# Patient Record
Sex: FEMALE | Race: ASIAN | NOT HISPANIC OR LATINO | Employment: UNEMPLOYED
[De-identification: names, ages, dates, MRNs, and addresses within clinical notes are randomized per-mention and may not be internally consistent; named-entity substitution may affect disease eponyms.]

---

## 2017-10-29 ENCOUNTER — HEALTH MAINTENANCE LETTER (OUTPATIENT)
Age: 57
End: 2017-10-29

## 2020-01-16 LAB
ALT SERPL-CCNC: 21 U/L (ref 6–29)
AST SERPL-CCNC: 20 U/L (ref 10–35)
CHOLESTEROL (EXTERNAL): 259 MG/DL
CREATININE (EXTERNAL): 0.52 MG/DL (ref 0.5–0.99)
GFR ESTIMATED (EXTERNAL): 103 ML/MIN/1.73M2
GFR ESTIMATED (IF AFRICAN AMERICAN) (EXTERNAL): 120 ML/MIN/1.73M2
GLUCOSE (EXTERNAL): 96 MG/DL (ref 65–99)
HDLC SERPL-MCNC: 41 MG/DL
LDL CHOLESTEROL (EXTERNAL): 163 MG/DL
NON HDL CHOLESTEROL (EXTERNAL): 218 MG/DL
POTASSIUM (EXTERNAL): 3.7 MMOL/L (ref 3.5–5.3)
TRIGLYCERIDES (EXTERNAL): 358 MG/DL

## 2021-01-28 LAB
ALT SERPL-CCNC: 24 U/L (ref 6–29)
AST SERPL-CCNC: 23 U/L (ref 10–35)
CHOLESTEROL (EXTERNAL): 282 MG/DL
CREATININE (EXTERNAL): 0.57 MG/DL (ref 0.5–0.99)
GFR ESTIMATED (EXTERNAL): 99 ML/MIN/1.73M2
GFR ESTIMATED (IF AFRICAN AMERICAN) (EXTERNAL): 115 ML/MIN/1.73M2
GLUCOSE (EXTERNAL): 107 MG/DL (ref 65–99)
HDLC SERPL-MCNC: 47 MG/DL
LDL CHOLESTEROL (EXTERNAL): 180 MG/DL
NON HDL CHOLESTEROL (EXTERNAL): 235 MG/DL
POTASSIUM (EXTERNAL): 4.2 MMOL/L (ref 3.5–5.3)
TRIGLYCERIDES (EXTERNAL): 325 MG/DL

## 2021-06-15 PROBLEM — E04.1 THYROID NODULE: Status: ACTIVE | Noted: 2017-01-05
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
## 2021-06-15 PROBLEM — S20.211A CHEST WALL HEMATOMA, RIGHT, INITIAL ENCOUNTER: Status: ACTIVE | Noted: 2017-01-04

## 2021-06-15 PROBLEM — S06.0X0A CONCUSSION WITHOUT LOSS OF CONSCIOUSNESS: Status: ACTIVE | Noted: 2017-01-06

## 2022-09-21 ENCOUNTER — TRANSFERRED RECORDS (OUTPATIENT)
Dept: HEALTH INFORMATION MANAGEMENT | Facility: CLINIC | Age: 62
End: 2022-09-21

## 2022-09-21 LAB
ALT SERPL-CCNC: 20 U/L (ref 6–29)
AST SERPL-CCNC: 19 U/L (ref 10–35)
CHOLESTEROL (EXTERNAL): 259 MG/DL
CREATININE (EXTERNAL): 0.56 MG/DL (ref 0.5–1.05)
GFR ESTIMATED (EXTERNAL): 102 ML/MIN/1.73M2
GLUCOSE (EXTERNAL): 109 MG/DL (ref 65–99)
HBA1C MFR BLD: 6.8 %
HDLC SERPL-MCNC: 49 MG/DL
LDL CHOLESTEROL (EXTERNAL): 175 MG/DL
NON HDL CHOLESTEROL (EXTERNAL): 210 MG/DL
POTASSIUM (EXTERNAL): 3.8 MMOL/L (ref 3.5–5.3)
TRIGLYCERIDES (EXTERNAL): 193 MG/DL
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
## 2023-01-31 ENCOUNTER — OFFICE VISIT (OUTPATIENT)
Dept: FAMILY MEDICINE | Facility: CLINIC | Age: 63
End: 2023-01-31
Payer: COMMERCIAL

## 2023-01-31 VITALS
SYSTOLIC BLOOD PRESSURE: 130 MMHG | HEIGHT: 58 IN | DIASTOLIC BLOOD PRESSURE: 75 MMHG | WEIGHT: 132 LBS | OXYGEN SATURATION: 99 % | RESPIRATION RATE: 20 BRPM | BODY MASS INDEX: 27.71 KG/M2 | TEMPERATURE: 98 F | HEART RATE: 75 BPM

## 2023-01-31 DIAGNOSIS — Z11.59 NEED FOR HEPATITIS C SCREENING TEST: ICD-10-CM

## 2023-01-31 DIAGNOSIS — Z13.220 SCREENING FOR HYPERLIPIDEMIA: ICD-10-CM

## 2023-01-31 DIAGNOSIS — I10 BENIGN ESSENTIAL HYPERTENSION: ICD-10-CM

## 2023-01-31 DIAGNOSIS — E78.00 PURE HYPERCHOLESTEROLEMIA: ICD-10-CM

## 2023-01-31 DIAGNOSIS — R20.0 NUMBNESS AND TINGLING IN LEFT HAND: ICD-10-CM

## 2023-01-31 DIAGNOSIS — G89.4 CHRONIC PAIN SYNDROME: ICD-10-CM

## 2023-01-31 DIAGNOSIS — E11.9 TYPE 2 DIABETES MELLITUS WITHOUT COMPLICATION, WITHOUT LONG-TERM CURRENT USE OF INSULIN (H): ICD-10-CM

## 2023-01-31 DIAGNOSIS — Z12.4 CERVICAL CANCER SCREENING: ICD-10-CM

## 2023-01-31 DIAGNOSIS — Z11.4 SCREENING FOR HIV (HUMAN IMMUNODEFICIENCY VIRUS): ICD-10-CM

## 2023-01-31 DIAGNOSIS — Z12.31 VISIT FOR SCREENING MAMMOGRAM: ICD-10-CM

## 2023-01-31 DIAGNOSIS — R20.2 NUMBNESS AND TINGLING IN LEFT HAND: ICD-10-CM

## 2023-01-31 DIAGNOSIS — M54.2 NECK PAIN: Primary | ICD-10-CM

## 2023-01-31 PROCEDURE — 99204 OFFICE O/P NEW MOD 45 MIN: CPT | Mod: GC

## 2023-01-31 RX ORDER — AMLODIPINE AND BENAZEPRIL HYDROCHLORIDE 5; 20 MG/1; MG/1
1 CAPSULE ORAL DAILY
Start: 2023-01-31 | End: 2023-04-03

## 2023-01-31 RX ORDER — GABAPENTIN 300 MG/1
300 CAPSULE ORAL AT BEDTIME
Qty: 90 CAPSULE | Refills: 1 | Status: SHIPPED | OUTPATIENT
Start: 2023-01-31 | End: 2024-01-17

## 2023-01-31 RX ORDER — ATORVASTATIN CALCIUM 40 MG/1
40 TABLET, FILM COATED ORAL DAILY
Start: 2023-01-31 | End: 2023-04-12

## 2023-01-31 RX ORDER — EZETIMIBE 10 MG/1
10 TABLET ORAL DAILY
Start: 2023-01-31 | End: 2023-04-03

## 2023-01-31 RX ORDER — FENOFIBRATE 145 MG/1
145 TABLET, COATED ORAL DAILY
Start: 2023-01-31 | End: 2023-04-03

## 2023-01-31 RX ORDER — METFORMIN HYDROCHLORIDE 750 MG/1
750 TABLET, EXTENDED RELEASE ORAL
Start: 2023-01-31 | End: 2023-04-03

## 2023-01-31 NOTE — PROGRESS NOTES
Preceptor Attestation:   Patient seen, evaluated and discussed with the resident. I have verified the content of the note, which accurately reflects my assessment of the patient and the plan of care.  Supervising Physician:Salome Leigh MD  [facility]

## 2023-02-01 ENCOUNTER — APPOINTMENT (OUTPATIENT)
Dept: INTERPRETER SERVICES | Facility: CLINIC | Age: 63
End: 2023-02-01
Payer: COMMERCIAL

## 2023-04-03 DIAGNOSIS — I10 BENIGN ESSENTIAL HYPERTENSION: ICD-10-CM

## 2023-04-03 DIAGNOSIS — E78.00 PURE HYPERCHOLESTEROLEMIA: ICD-10-CM

## 2023-04-03 DIAGNOSIS — E11.9 TYPE 2 DIABETES MELLITUS WITHOUT COMPLICATION, WITHOUT LONG-TERM CURRENT USE OF INSULIN (H): ICD-10-CM

## 2023-04-05 RX ORDER — ATORVASTATIN CALCIUM 20 MG/1
20 TABLET, FILM COATED ORAL AT BEDTIME
Qty: 90 TABLET | Refills: 0 | Status: SHIPPED | OUTPATIENT
Start: 2023-04-05 | End: 2023-04-12

## 2023-04-05 RX ORDER — METFORMIN HYDROCHLORIDE 750 MG/1
750 TABLET, EXTENDED RELEASE ORAL
Qty: 90 TABLET | Refills: 0 | Status: SHIPPED | OUTPATIENT
Start: 2023-04-05 | End: 2023-04-12

## 2023-04-05 RX ORDER — EZETIMIBE 10 MG/1
10 TABLET ORAL DAILY
Qty: 90 TABLET | Refills: 0 | Status: SHIPPED | OUTPATIENT
Start: 2023-04-05 | End: 2023-04-12

## 2023-04-05 RX ORDER — FENOFIBRATE 145 MG/1
145 TABLET, COATED ORAL DAILY
Qty: 90 TABLET | Refills: 0 | Status: SHIPPED | OUTPATIENT
Start: 2023-04-05 | End: 2023-04-12

## 2023-04-05 RX ORDER — AMLODIPINE AND BENAZEPRIL HYDROCHLORIDE 5; 20 MG/1; MG/1
1 CAPSULE ORAL DAILY
Qty: 90 CAPSULE | Refills: 0 | Status: SHIPPED | OUTPATIENT
Start: 2023-04-05 | End: 2023-04-12

## 2023-04-05 NOTE — TELEPHONE ENCOUNTER
Called pt son's because pt does not understand english. I called and let him know that he needed to make an appointment for Luze for medication refills and labs needed. He said he will let Chloe Silva (Mother) know and will give us a call back to schedule.     Thanks,   NAOMY, CMA

## 2023-04-05 NOTE — TELEPHONE ENCOUNTER
Please call the patient and ask her to come in for a med refill appointment in the next four weeks. She would need lab monitoring of her cholesterol and A1c.     Juanita Lopez MD

## 2023-04-12 ENCOUNTER — OFFICE VISIT (OUTPATIENT)
Dept: FAMILY MEDICINE | Facility: CLINIC | Age: 63
End: 2023-04-12
Payer: COMMERCIAL

## 2023-04-12 VITALS
BODY MASS INDEX: 27.92 KG/M2 | HEIGHT: 58 IN | DIASTOLIC BLOOD PRESSURE: 72 MMHG | OXYGEN SATURATION: 96 % | SYSTOLIC BLOOD PRESSURE: 112 MMHG | RESPIRATION RATE: 20 BRPM | TEMPERATURE: 98.2 F | HEART RATE: 72 BPM | WEIGHT: 133 LBS

## 2023-04-12 DIAGNOSIS — I10 BENIGN ESSENTIAL HYPERTENSION: ICD-10-CM

## 2023-04-12 DIAGNOSIS — E11.9 TYPE 2 DIABETES MELLITUS WITHOUT COMPLICATION, WITHOUT LONG-TERM CURRENT USE OF INSULIN (H): Primary | ICD-10-CM

## 2023-04-12 DIAGNOSIS — E78.00 PURE HYPERCHOLESTEROLEMIA: ICD-10-CM

## 2023-04-12 LAB
ANION GAP SERPL CALCULATED.3IONS-SCNC: 14 MMOL/L (ref 7–15)
BUN SERPL-MCNC: 11.2 MG/DL (ref 8–23)
CALCIUM SERPL-MCNC: 9.4 MG/DL (ref 8.8–10.2)
CHLORIDE SERPL-SCNC: 103 MMOL/L (ref 98–107)
CREAT SERPL-MCNC: 0.57 MG/DL (ref 0.51–0.95)
CREAT UR-MCNC: 39.9 MG/DL
DEPRECATED HCO3 PLAS-SCNC: 21 MMOL/L (ref 22–29)
GFR SERPL CREATININE-BSD FRML MDRD: >90 ML/MIN/1.73M2
GLUCOSE SERPL-MCNC: 250 MG/DL (ref 70–99)
HBA1C MFR BLD: 7.2 % (ref 0–5.6)
MICROALBUMIN UR-MCNC: <12 MG/L
MICROALBUMIN/CREAT UR: NORMAL MG/G{CREAT}
POTASSIUM SERPL-SCNC: 3.7 MMOL/L (ref 3.4–5.3)
SODIUM SERPL-SCNC: 138 MMOL/L (ref 136–145)

## 2023-04-12 PROCEDURE — 83036 HEMOGLOBIN GLYCOSYLATED A1C: CPT

## 2023-04-12 PROCEDURE — 82570 ASSAY OF URINE CREATININE: CPT

## 2023-04-12 PROCEDURE — 99214 OFFICE O/P EST MOD 30 MIN: CPT | Mod: GC

## 2023-04-12 PROCEDURE — 36415 COLL VENOUS BLD VENIPUNCTURE: CPT

## 2023-04-12 PROCEDURE — 80048 BASIC METABOLIC PNL TOTAL CA: CPT

## 2023-04-12 PROCEDURE — 82043 UR ALBUMIN QUANTITATIVE: CPT

## 2023-04-12 RX ORDER — ATORVASTATIN CALCIUM 40 MG/1
40 TABLET, FILM COATED ORAL DAILY
Qty: 90 TABLET | Refills: 3 | Status: SHIPPED | OUTPATIENT
Start: 2023-04-12 | End: 2024-04-05

## 2023-04-12 RX ORDER — FENOFIBRATE 145 MG/1
145 TABLET, COATED ORAL DAILY
Qty: 90 TABLET | Refills: 3 | Status: SHIPPED | OUTPATIENT
Start: 2023-04-12 | End: 2024-07-01

## 2023-04-12 RX ORDER — METFORMIN HYDROCHLORIDE 750 MG/1
750 TABLET, EXTENDED RELEASE ORAL
Qty: 90 TABLET | Refills: 3 | Status: SHIPPED | OUTPATIENT
Start: 2023-04-12 | End: 2023-11-13

## 2023-04-12 RX ORDER — EZETIMIBE 10 MG/1
10 TABLET ORAL DAILY
Qty: 90 TABLET | Refills: 3 | Status: SHIPPED | OUTPATIENT
Start: 2023-04-12 | End: 2024-05-02

## 2023-04-12 RX ORDER — AMLODIPINE AND BENAZEPRIL HYDROCHLORIDE 5; 20 MG/1; MG/1
1 CAPSULE ORAL DAILY
Qty: 90 CAPSULE | Refills: 3 | Status: SHIPPED | OUTPATIENT
Start: 2023-04-12 | End: 2023-11-13

## 2023-04-12 NOTE — LETTER
April 17, 2023      Chloe Silva  [address]  [place] [address]        Dear ,    We are writing to inform you of your test results.    At your appointment earlier this week, we checked a couple of labs and the results are back. We checked your electrolytes and kidney function, and which was normal. This test showed high blood sugar levels, but this is expected with your diabetes. We also did a test of your urine to make sure it didn't have extra protein in it; this test came back normal.        Resulted Orders   Hemoglobin A1c   Result Value Ref Range    Hemoglobin A1C 7.2 (H) 0.0 - 5.6 %      Comment:      Normal <5.7%   Prediabetes 5.7-6.4%    Diabetes 6.5% or higher     Note: Adopted from ADA consensus guidelines.   Albumin Random Urine Quantitative with Creat Ratio   Result Value Ref Range    Creatinine Urine mg/dL 39.9 mg/dL      Comment:      The reference ranges have not been established in urine creatinine. The results should be integrated into the clinical context for interpretation.    Albumin Urine mg/L <12.0 mg/L      Comment:      The reference ranges have not been established in urine albumin. The results should be integrated into the clinical context for interpretation.    Albumin Urine mg/g Cr        Comment:      Unable to calculate, urine albumin and/or urine creatinine is outside detectable limits.  Microalbuminuria is defined as an albumin:creatinine ratio of 17 to 299 for males and 25 to 299 for females. A ratio of albumin:creatinine of 300 or higher is indicative of overt proteinuria.  Due to biologic variability, positive results should be confirmed by a second, first-morning random or 24-hour timed urine specimen. If there is discrepancy, a third specimen is recommended. When 2 out of 3 results are in the microalbuminuria range, this is evidence for incipient nephropathy and warrants increased efforts at glucose control, blood pressure control, and institution of therapy  with an angiotensin-converting-enzyme (ACE) inhibitor (if the patient can tolerate it).     Basic metabolic panel   Result Value Ref Range    Sodium 138 136 - 145 mmol/L    Potassium 3.7 3.4 - 5.3 mmol/L    Chloride 103 98 - 107 mmol/L    Carbon Dioxide (CO2) 21 (L) 22 - 29 mmol/L    Anion Gap 14 7 - 15 mmol/L    Urea Nitrogen 11.2 8.0 - 23.0 mg/dL    Creatinine 0.57 0.51 - 0.95 mg/dL    Calcium 9.4 8.8 - 10.2 mg/dL    Glucose 250 (H) 70 - 99 mg/dL    GFR Estimate >90 >60 mL/min/1.73m2      Comment:      eGFR calculated using 2021 CKD-EPI equation.       If you have any questions or concerns, please call the clinic at the number listed above.       Sincerely,      Fanny Humphrey MD

## 2023-04-12 NOTE — PROGRESS NOTES
Assessment & Plan     Type 2 diabetes mellitus without complication, without long-term current use of insulin (H)  Patient presents for diabetes follow up. Discussed her healthy diet. Currently managed on metformin 750 XR daily. Last A1c six months ago was 6.8, today is 7.2. Patient does not take her medications consistently, discussed the importance of taking her metformin daily. Patient has not had a diabetic eye exam; discussed that she would be willing to go if she were able to get a medical ride. Will discuss with clinic  to see if she qualifies, and will place referral today.   - Hemoglobin A1c  - Albumin Random Urine Quantitative with Creat Ratio  - Basic metabolic panel  - Adult Eye  Referral; Future  - metFORMIN (GLUCOPHAGE-XR) 750 MG 24 hr tablet; Take 1 tablet (750 mg) by mouth daily (with dinner)  - [facility] FOOT EXAM  NO CHARGE    Benign essential hypertension  Patient reports that she takes this medication daily. Blood pressure is good today.   - amLODIPine-benazepril (LOTREL) 5-20 MG capsule; Take 1 capsule by mouth daily    Pure hypercholesterolemia  Refilled previously prescribed medications. Lipid panel was last checked six months ago, will repeat at next visit.   - fenofibrate (TRICOR) 145 MG tablet; Take 1 tablet (145 mg) by mouth daily  - ezetimibe (ZETIA) 10 MG tablet; Take 1 tablet (10 mg) by mouth daily  - atorvastatin (LIPITOR) 40 MG tablet; Take 1 tablet (40 mg) by mouth daily    Return in about 6 months (around 10/12/2023).    Juanita Lopez MD  [facility]    Gregor Corona is a 62 year old, presenting for the following health issues:  RECHECK (DM)    HPI     DM follow up  - Current medications: metformin 750 XR daily    Any recent medication changes: No recent changes    Any problems/side effects after medication change: Takes every so often, does not take daily.   - Diet: bland diet, eats a lot of vegetables. Doesn't eat noodles, eats  "rice with most meals, tries to eat balanced diet.   - Neuropathy in feet: No   - Last Ophthalmology visit: none, will place referral today.   - Last A1C: 6.8 in September 2022  - Hypoglycemia sx: Denies headaches, lightheadedness, confusion, diaphoresis, tremor, palpitations    HTN follow up  - Medications: amlodipine-benzapril 5-20 mg    - Any recent changes: None   - Any side effects/intolerance: None   - Home BP values: None   - Low BP sx: Denies lightheadedness, orthostatic hypotension  - High BP sx: Denies headache, visual blurring, chest pain, SOB, leg swelling    Review of Systems   Constitutional, HEENT, cardiovascular, pulmonary, gi and gu systems are negative, except as otherwise noted.      Objective    /72   Pulse 72   Temp 98.2  F (36.8  C)   Resp 20   Ht 1.473 m (4' 10\")   Wt 60.3 kg (133 lb)   SpO2 96%   BMI 27.80 kg/m    Body mass index is 27.8 kg/m .  Physical Exam   GENERAL: healthy, alert and no distress  EYES: Eyes grossly normal to inspection, PERRL and conjunctivae and sclerae normal  RESP: lungs clear to auscultation - no rales, rhonchi or wheezes  CV: regular rate and rhythm, normal S1 S2, no S3 or S4, no murmur, click or rub, no peripheral edema and peripheral pulses strong  MS: no gross musculoskeletal defects noted, no edema  SKIN: no suspicious lesions or rashes  PSYCH: mentation appears normal, affect normal/bright  Diabetic foot exam: normal DP and PT pulses, no trophic changes or ulcerative lesions, normal sensory exam and normal monofilament exam            "

## 2023-04-19 NOTE — PROGRESS NOTES
Preceptor Attestation:   Patient seen, evaluated and discussed with the resident. I have verified the content of the note, which accurately reflects my assessment of the patient and the plan of care.    Supervising Physician:Fanny Humphrey MD    [facility]

## 2023-04-20 ENCOUNTER — PATIENT OUTREACH (OUTPATIENT)
Dept: CARE COORDINATION | Facility: CLINIC | Age: 63
End: 2023-04-20
Payer: COMMERCIAL

## 2023-04-20 NOTE — PROGRESS NOTES
Clinic Care Coordination Contact    Follow Up Progress Note      Assessment: I got a message from  about the pt. It seems like the pt is having issue with transportation. I called [facility] to see if the pt has transportation benefits. They stated that the pt has transportation benefits.     I called the pt, her phone rings, then goes busy.     Care Gaps:    Health Maintenance Due   Topic Date Due     YEARLY PREVENTIVE VISIT  Never done     ADVANCE CARE PLANNING  Never done     EYE EXAM  Never done     MAMMO SCREENING  Never done     COVID-19 Vaccine (1) Never done     Pneumococcal Vaccine: Pediatrics (0 to 5 Years) and At-Risk Patients (6 to 64 Years) (1 - PCV) Never done     COLORECTAL CANCER SCREENING  Never done     HIV SCREENING  Never done     HEPATITIS C SCREENING  Never done     PAP  Never done     DTAP/TDAP/TD IMMUNIZATION (1 - Tdap) Never done     ZOSTER IMMUNIZATION (1 of 2) Never done     INFLUENZA VACCINE (1) 09/01/2022           Care Plans      Intervention/Education provided during outreach:               Plan:     Care Coordinator will follow up in

## 2023-07-27 ENCOUNTER — TRANSFERRED RECORDS (OUTPATIENT)
Dept: HEALTH INFORMATION MANAGEMENT | Facility: CLINIC | Age: 63
End: 2023-07-27
Payer: COMMERCIAL

## 2023-10-30 ENCOUNTER — OFFICE VISIT (OUTPATIENT)
Dept: FAMILY MEDICINE | Facility: CLINIC | Age: 63
End: 2023-10-30
Payer: COMMERCIAL

## 2023-10-30 VITALS
TEMPERATURE: 97.7 F | WEIGHT: 136 LBS | DIASTOLIC BLOOD PRESSURE: 84 MMHG | BODY MASS INDEX: 28.55 KG/M2 | OXYGEN SATURATION: 99 % | RESPIRATION RATE: 20 BRPM | SYSTOLIC BLOOD PRESSURE: 152 MMHG | HEIGHT: 58 IN | HEART RATE: 74 BPM

## 2023-10-30 DIAGNOSIS — K21.9 GASTROESOPHAGEAL REFLUX DISEASE WITHOUT ESOPHAGITIS: Primary | ICD-10-CM

## 2023-10-30 DIAGNOSIS — Z12.11 SCREEN FOR COLON CANCER: ICD-10-CM

## 2023-10-30 DIAGNOSIS — K59.09 OTHER CONSTIPATION: ICD-10-CM

## 2023-10-30 DIAGNOSIS — E11.9 TYPE 2 DIABETES MELLITUS WITHOUT COMPLICATION, WITHOUT LONG-TERM CURRENT USE OF INSULIN (H): ICD-10-CM

## 2023-10-30 LAB — HBA1C MFR BLD: 7.9 % (ref 0–5.6)

## 2023-10-30 PROCEDURE — 36415 COLL VENOUS BLD VENIPUNCTURE: CPT

## 2023-10-30 PROCEDURE — 83721 ASSAY OF BLOOD LIPOPROTEIN: CPT | Mod: XU

## 2023-10-30 PROCEDURE — 91320 SARSCV2 VAC 30MCG TRS-SUC IM: CPT

## 2023-10-30 PROCEDURE — 99214 OFFICE O/P EST MOD 30 MIN: CPT | Mod: 25

## 2023-10-30 PROCEDURE — 80061 LIPID PANEL: CPT

## 2023-10-30 PROCEDURE — 90471 IMMUNIZATION ADMIN: CPT

## 2023-10-30 PROCEDURE — 83036 HEMOGLOBIN GLYCOSYLATED A1C: CPT

## 2023-10-30 PROCEDURE — 90662 IIV NO PRSV INCREASED AG IM: CPT

## 2023-10-30 PROCEDURE — 90480 ADMN SARSCOV2 VAC 1/ONLY CMP: CPT

## 2023-10-30 RX ORDER — FAMOTIDINE 10 MG
10 TABLET ORAL 2 TIMES DAILY
Qty: 60 TABLET | Refills: 0 | Status: SHIPPED | OUTPATIENT
Start: 2023-10-30 | End: 2023-11-13

## 2023-10-30 RX ORDER — POLYETHYLENE GLYCOL 3350 17 G/17G
1 POWDER, FOR SOLUTION ORAL DAILY
Qty: 578 G | Refills: 0 | Status: SHIPPED | OUTPATIENT
Start: 2023-10-30 | End: 2024-06-03

## 2023-10-30 NOTE — PROGRESS NOTES
Assessment & Plan     Gastroesophageal reflux disease without esophagitis  Patient describes symptoms consistent with GERD for the past month. No epigastric tenderness on exam, denies black or bloody stools. Doesn't eat meals late, no ibuprofen use, minimal caffeine. Will start treatment with BID Pepcid and follow-up in two weeks.   - famotidine (PEPCID) 10 MG tablet  Dispense: 60 tablet; Refill: 0    Other constipation  Patient describes decreased frequency of bowel movements and increasing straining to have a BM over the past month. Denies melena or hematochezia. Discussed dietary recommendations. Will start Miralax today.   - polyethylene glycol (MIRALAX) 17 GM/Dose powder  Dispense: 578 g; Refill: 0    Type 2 diabetes mellitus without complication, without long-term current use of insulin (H)  Patient is currently on metformin, last A1c was 7.2 six months ago. Will recheck A1c today.   - HEMOGLOBIN A1C  - Lipid panel reflex to direct LDL Non-fasting    Screen for colon cancer  No history of colon cancer screening on file. Discussed recommendation for everyone over 50, but especially in the context of recent change in bowel movements. She declines today, but says she will consider if her bowel movements are not improved by next appointment.     Return in about 2 weeks (around 11/13/2023).    Juanita Lopez MD  [facility] Corona is a 63 year old, presenting for the following health issues:  Gastrophageal Reflux (Feels bloating too ) and Constipation    HPI     Reflux   - Burning feeling in the center of her chest  - She notices it the most in the morning  - Has been struggling with this for a month   - Hasn't had this issue before   - Denies bloody or black stools   - No ibuprofen use   - Eats dinner at about 6:00-7:00 PM     Constipation  - Used to have one to two bowel movements in a day  - In the past month, changed to every 3 days   - Straining to have a bowel  "movement   - The bowel movements are not pebble-like when they occur, just less frequent   - Notes some bloating during this period of time, denies abdominal pain  - Denies nausea or vomiting   - She has never had a colonoscopy   - Would consider FIT test at next appointment     Review of Systems   Constitutional, HEENT, cardiovascular, pulmonary, gi and gu systems are negative, except as otherwise noted.      Objective    BP (!) 152/84   Pulse 74   Temp 97.7  F (36.5  C)   Resp 20   Ht 1.475 m (4' 10.07\")   Wt 61.7 kg (136 lb)   SpO2 99%   BMI 28.35 kg/m    Body mass index is 28.35 kg/m .  Physical Exam     GENERAL: Healthy, alert and no distress  EYES: Eyes grossly normal to inspection.  No discharge or erythema, or obvious scleral/conjunctival abnormalities.  RESP: No audible wheeze, cough, or visible cyanosis.  No visible retractions or increased work of breathing.   AB: Soft, non-tender, non-distended.    SKIN: Visible skin clear. No significant rash, abnormal pigmentation or lesions.  NEURO: Cranial nerves grossly intact.  Mentation and speech appropriate for age.  PSYCH: Mentation appears normal, affect normal/bright, judgement and insight intact, normal speech and appearance well-groomed.                  "

## 2023-10-30 NOTE — COMMUNITY RESOURCES LIST (ENGLISH)
10/30/2023   [facility]  [facility]A  For questions about this resource list or additional care needs, please contact your primary care clinic or care manager.  Phone: [phone]   Email: N/A   Address: [address] [place] [address]   Hours: N/A        Hotlines and Helplines       Hotline - Housing crisis  1  [facility] Distance: 11.22 miles      Phone/Virtual   [address] [place] [address]  Language: English  Hours: Mon - Sun Open 24 Hours   Phone: (634) 931-8893 Email: communications@[facility].org Website: https://[facility]-[place].org/oursaviourshousing/     2  [facility] Distance: 12.83 miles      Phone/Virtual   [address] [place] [address]  Language: English  Hours: Mon - Sun Open 24 Hours   Phone: (110) 506-9391 Email: info@[facility].org Website: http://www.[facility].org          Housing       Coordinated Entry access point  3  [facility] Distance: 3.85 miles      In-Person, Phone/Virtual   [address] [place] [address]  Language: English, Nepali  Hours: Mon - Fri 8:30 AM - 4:30 PM  Fees: Free   Phone: (261) 949-4662 Email: info@[facility].org Website: https://www.[facility].org/locations/[facility]-clinic/     4  [facility] - Coordinated Access to Housing and Shelter ([facility]) - Coordinated Access Distance: 6.03 miles      In-Person, Phone/Virtual   [address] [place] [address]  Language: English  Hours: Mon - Fri 8:00 AM - 4:30 PM  Fees: Free   Phone: (904) 993-2128 Website: https://www.[facility]./residents/assistance-support/assistance/housing-services-support     Drop-in center or day shelter  5  [facility] Distance: 2.3 miles      In-Person   [address] [place] [address]  Language: English  Hours: Mon - Fri 9:00 AM - 4:00 PM  Fees: Free   Phone: (505) 903-7933 Email: frontelisek@[employer].org Website:  http://[facility].org     6  [facility] Distance: 11.23 miles      In-Person   [address] [place] [address]  Language: English, Kuwaiti, Romanian  Hours: Mon - Sat 7:00 AM - 3:00 PM  Fees: Free, Self Pay   Phone: (905) 357-1352 Email: info@[employer] Website: https://www.[employer]/locations/opportunity-center/     Housing search assistance  7  [facility] - Housing Search Assistance Distance: 3.73 miles      Phone/Virtual   [address] [place] [address]  Language: Chinese, English, Hmong, Fanny, Kuwaiti, Romanian  Hours: Mon - Fri Appt. Only  Fees: Free   Phone: (707) 555-2180 Website: https://[employer]/     8  [facility] - Online Housing Search Assistance Distance: 7.02 miles      Phone/Virtual   [address] [place] [address]  Language: English  Hours: Mon - Sun Open 24 Hours  Fees: Free   Phone: (425) 252-9067 Email: findjose@[employer] Website: https://[employer]/     Shelter for families  9   [facility] Distance: 12.75 miles      In-Person   [address] [place] [address]  Language: English  Hours: Mon - Fri 3:00 PM - 9:00 AM , Sat - Sun Open 24 Hours  Fees: Free   Phone: (999) 805-2790 Ext.1 Website: https://www.[facility].org/2020/07/03/emergency-family-shelter/     Shelter for individuals  10  [facility] [facility] [place] [facility] [place] [facility] Distance: 3.9 miles      In-Person   [address] [place] [address]  Language: English  Hours: Mon - Sun 5:00 PM - 10:00 AM  Fees: Free, Self Pay   Phone: (961) 319-5145 Email: info@[employer] Website: https://www.[employer]/locations/[facility]/     11  [facility] Distance: 11.22 miles      In-Person   [address] [place] [address]   Language: English  Hours: Mon - Sun Open 24 Hours  Fees: Free   Phone: (215) 904-8421 Email: communications@[facility].org Website: https://[facility].org/oursaviourshousing/          Transportation       Free or low-cost transportation  12  [employer] [facility] - Free or low-cost transportation Distance: 5.37 miles      In-Person   [address] [place] [address]  Language: English  Hours: Mon - Fri 9:00 AM - 5:00 PM  Fees: Free   Phone: (355) 750-3412 Email: info@[employer] Website: https://www.[employer]/     13  [facility] [facility] [place] [facility] Distance: 5.85 miles      In-Person   [address] [place] [address]  Language: English  Hours: Tue 9:00 AM - 2:00 PM  Fees: Self Pay   Phone: (263) 854-4721 Email: info@[employer] Website: http://www.[employer].org/     Transportation to medical appointments  14  [facility] - Non-Emergency Medical Transportation Distance: 4.31 miles      In-Person   [address] [place] [address]  Language: English  Hours: Mon - Fri 8:00 AM - 4:00 PM Appt. Only  Fees: Self Pay   Phone: (857) 260-1105 Website: http://www.[employer].comhealth.org/Medical-Services/Emergency-medical-services/Non-emergency-transportation/     15  [employer] [facility] Distance: 4.35 miles      In-Person   [address] [address]  Language: English  Hours: Mon - Thu 6:00 AM - 6:00 PM , Fri 6:00 AM - 5:00 PM  Fees: Insurance, Self Pay   Phone: (789) 919-3349 Email: office@[employer] Website: https://www.[employer]/          Important Numbers & Websites       Emergency Services   911  City Services   311  Poison Control   (156) 580-1180  Suicide Prevention Lifeline   (147) 455-2732 (TALK)  Child Abuse Hotline   (507) 323-4343 (4-A-Child)  Sexual Assault Hotline   (476) 838-3879 (HOPE)  National Runaway Safeline   (517) 196-2572 (RUNAWAY)  All-Options Talkline   (458) 649-8634  Substance Abuse Referral   (413) 846-7960 (HELP)

## 2023-10-30 NOTE — COMMUNITY RESOURCES LIST (ENGLISH)
10/30/2023   [facility] - Outpatient Clinics  N/A  For additional resource needs, please contact your health insurance member services or your primary care team.  Phone: [phone]   Email: N/A   Address: [facility] [place] [address]   Hours: N/A        Hotlines and Helplines       Hotline - Housing crisis  1  [facility] Distance: 11.22 miles      Phone/Virtual   [address] [place] [address]  Language: English  Hours: Mon - Sun Open 24 Hours   Phone: (794) 529-5517 Email: communications@[facility].[facility] Website: https://[facility].org/oursaviourshousing/     2  [facility] Distance: 12.83 miles      Phone/Virtual   [address] [place] [address]  Language: English  Hours: Mon - Sun Open 24 Hours   Phone: (807) 890-5030 Email: info@[facility].[employer] Website: http://www.[facility].org          Housing       Coordinated Entry access point  3  [facility] [place] [facility] Distance: 3.85 miles      In-Person, Phone/Virtual   [address] [place] [address]  Language: English, Irish  Hours: Mon - Fri 8:30 AM - 4:30 PM  Fees: Free   Phone: (870) 769-2527 Email: info@[facility].org Website: https://www.[facility].org/locations/[facility]/     4  [facility] - Coordinated Access to Housing and Shelter ([facility]) - Coordinated Access Distance: 6.03 miles      In-Person, Phone/Virtual   [address] [place] [address]  Language: English  Hours: Mon - Fri 8:00 AM - 4:30 PM  Fees: Free   Phone: (308) 623-7160 Website: https://www.[facility]./residents/assistance-support/assistance/housing-services-support     Drop-in center or day shelter  5  [facility] Distance: 2.3 miles      In-Person   [address] [place] [address]  Language: English  Hours: Mon - Fri 9:00 AM - 4:00 PM  Fees: Free   Phone: (857) 860-5463 Email: cheryl@[facility].org Website:  http://listeninghouse.org     6  [facility] Distance: 11.23 miles      In-Person   [address] [place] [address]  Language: English, Samoan, Ukrainian  Hours: Mon - Sat 7:00 AM - 3:00 PM  Fees: Free, Self Pay   Phone: (392) 658-4637 Email: info@[employer] Website: https://www.[employer]/locations/opportunity-center/     Housing search assistance  7  [employer] - https://[employer]/ Distance: 11.32 miles      Phone/Virtual   [address] [place] [address]  Language: English  Hours: Mon - Sun Open 24 Hours   Email: info@[employer] Website: https://[employer]     8  [facility] - Online housing search assistance Distance: 12.47 miles      Phone/Virtual   [address] [address]  Language: English, Hmong, Samoan, Ukrainian  Hours: Mon - Sun Open 24 Hours   Phone: (187) 447-7064 Email: info@[employer].org Website: http://www.[facility].org/     Shelter for families  9  [facility] Distance: 12.75 miles      In-Person   [address] [place] [address]  Language: English  Hours: Mon - Fri 3:00 PM - 9:00 AM , Sat - Sun Open 24 Hours  Fees: Free   Phone: (184) 428-8235 Ext.1 Website: https://www.[facility].org/2020/07/03/emergency-family-shelter/     Shelter for individuals  10  [facility] [place] [facility] [place] [facility] Distance: 3.9 miles      In-Person   [address] [place] [address]  Language: English  Hours: Mon - Sun 5:00 PM - 10:00 AM  Fees: Free, Self Pay   Phone: (689) 696-2058 Email: info@[employer] Website: https://www.[employer]/locations/[facility]/     11  [facility] Distance: 11.22 miles      In-Person   [address] [place] [place] [address]  Language: English  Hours: Mon - Sun Open 24 Hours  Fees:  Free   Phone: (854) 882-9349 Email: communications@[facility].org Website: https://[facility].org/oursaviourshousing/          Transportation       Free or low-cost transportation  12  [employer] [facility] - Free or low-cost transportation Distance: 5.37 miles      In-Person   [address] [place] [address]  Language: English  Hours: Mon - Fri 9:00 AM - 5:00 PM  Fees: Free   Phone: (297) 578-8031 Email: info@[employer] Website: https://www.[employer]/     13  [facility] [facility] [employer] [facility] Distance: 5.85 miles      In-Person   [address] [place] [address]  Language: English  Hours: Tue 9:00 AM - 2:00 PM  Fees: Self Pay   Phone: (463) 834-2866 Email: info@[employer] Website: http://www.[employer].org/     Transportation to medical appointments  14  [employer] Medical Transportation - Non-Emergency Medical Transportation Distance: 4.31 miles      In-Person   [address] [place] [address]  Language: English  Hours: Mon - Fri 8:00 AM - 4:00 PM Appt. Only  Fees: Self Pay   Phone: (908) 393-5240 Website: http://www.[employer].org/Medical-Services/Emergency-medical-services/Non-emergency-transportation/     15  [employer] [facility] Distance: 4.35 miles      In-Person   [address] [address]  Language: English  Hours: Mon - Thu 6:00 AM - 6:00 PM , Fri 6:00 AM - 5:00 PM  Fees: Insurance, Self Pay   Phone: (457) 265-7328 Email: office@[employer] Website: https://www.[employer]/          Important Numbers & Websites       [address].org  Poison Control   (103) 349-1154 [place]  Suicide and Crisis Lifeline   988 [facility]88[facility].org  Childhelp National Child Abuse Hotline   938.178.5014 Childhelphotline.org  National Sexual Assault Hotline   (942) 940-2240 (HOPE) Rainn.org  National Runaway Safeline   (721) 374-2255 (RUNAWAY) 1800runaway.org  Pregnancy & Postpartum Support [place]   Call/text 946-665-2501  Ppsupportmn.org  Substance Abuse National Helpline ([facility]   229-451-HELP (8394) Findtreatment.gov  Emergency Services   913

## 2023-10-30 NOTE — LETTER
November 2, 2023      Chloe Silva  [address]  [place] [address]        Dear ,    We are writing to inform you of your test results.    At your last visit, we checked your cholesterol levels and A1c.     1. Your A1c is slightly higher than last check. It was 7.2 six months ago, and is now up to 7.9. At your next visit on 11/13, we can talk about possibly increasing your metformin.     2. Your cholesterol continues to be very elevated, and I know you are on multiple medicines for this. We can talk about possible changes at the next appointment.     See you on November 13th!         Resulted Orders   HEMOGLOBIN A1C   Result Value Ref Range    Hemoglobin A1C 7.9 (H) 0.0 - 5.6 %      Comment:      Normal <5.7%   Prediabetes 5.7-6.4%    Diabetes 6.5% or higher     Note: Adopted from ADA consensus guidelines.   Lipid panel reflex to direct LDL Non-fasting   Result Value Ref Range    Cholesterol 286 (H) <200 mg/dL    Triglycerides 876 (H) <150 mg/dL    Direct Measure HDL 42 (L) >=50 mg/dL    LDL Cholesterol Calculated        Comment:      Cannot estimate LDL when triglyceride exceeds 400 mg/dL    Non HDL Cholesterol 244 (H) <130 mg/dL    Narrative    Cholesterol  Desirable:  <200 mg/dL    Triglycerides  Normal:  Less than 150 mg/dL  Borderline High:  150-199 mg/dL  High:  200-499 mg/dL  Very High:  Greater than or equal to 500 mg/dL    Direct Measure HDL  Female:  Greater than or equal to 50 mg/dL   Male:  Greater than or equal to 40 mg/dL    LDL Cholesterol  Desirable:  <100mg/dL  Above Desirable:  100-129 mg/dL   Borderline High:  130-159 mg/dL   High:  160-189 mg/dL   Very High:  >= 190 mg/dL    Non HDL Cholesterol  Desirable:  130 mg/dL  Above Desirable:  130-159 mg/dL  Borderline High:  160-189 mg/dL  High:  190-219 mg/dL  Very High:  Greater than or equal to 220 mg/dL   LDL cholesterol direct   Result Value Ref Range    LDL Cholesterol Direct 123 (H) <100 mg/dL      Comment:      Age 2-19  years:  Desirable: 0-110 mg/dL   Borderline high: 110-129 mg/dL   High: >= 130 mg/dL    Age 20 years and older:  Desirable: <100mg/dL  Above desirable: 100-129 mg/dL   Borderline high: 130-159 mg/dL   High: 160-189 mg/dL   Very high: >= 190 mg/dL       If you have any questions or concerns, please call the clinic at the number listed above.       Sincerely,      Ilda Funez MD

## 2023-10-30 NOTE — PROGRESS NOTES
Faculty Supervision of Residents   I have examined this patient and the medical care has been evaluated and discussed with the resident. See resident note outlining our discussion.      Ilda Funez MD

## 2023-10-31 LAB
CHOLEST SERPL-MCNC: 286 MG/DL
HDLC SERPL-MCNC: 42 MG/DL
LDLC SERPL CALC-MCNC: ABNORMAL MG/DL
LDLC SERPL DIRECT ASSAY-MCNC: 123 MG/DL
NONHDLC SERPL-MCNC: 244 MG/DL
TRIGL SERPL-MCNC: 876 MG/DL

## 2023-11-13 ENCOUNTER — OFFICE VISIT (OUTPATIENT)
Dept: FAMILY MEDICINE | Facility: CLINIC | Age: 63
End: 2023-11-13
Payer: COMMERCIAL

## 2023-11-13 VITALS
HEART RATE: 75 BPM | RESPIRATION RATE: 22 BRPM | WEIGHT: 133 LBS | BODY MASS INDEX: 27.92 KG/M2 | OXYGEN SATURATION: 98 % | TEMPERATURE: 97 F | DIASTOLIC BLOOD PRESSURE: 81 MMHG | HEIGHT: 58 IN | SYSTOLIC BLOOD PRESSURE: 143 MMHG

## 2023-11-13 DIAGNOSIS — K21.9 GASTROESOPHAGEAL REFLUX DISEASE WITHOUT ESOPHAGITIS: Primary | ICD-10-CM

## 2023-11-13 DIAGNOSIS — E78.1 HYPERTRIGLYCERIDEMIA: ICD-10-CM

## 2023-11-13 DIAGNOSIS — K59.09 OTHER CONSTIPATION: ICD-10-CM

## 2023-11-13 DIAGNOSIS — I10 BENIGN ESSENTIAL HYPERTENSION: ICD-10-CM

## 2023-11-13 DIAGNOSIS — E11.9 TYPE 2 DIABETES MELLITUS WITHOUT COMPLICATION, WITHOUT LONG-TERM CURRENT USE OF INSULIN (H): ICD-10-CM

## 2023-11-13 DIAGNOSIS — R11.2 NAUSEA AND VOMITING, UNSPECIFIED VOMITING TYPE: ICD-10-CM

## 2023-11-13 DIAGNOSIS — E78.2 MIXED HYPERLIPIDEMIA: ICD-10-CM

## 2023-11-13 DIAGNOSIS — Z12.11 SCREEN FOR COLON CANCER: ICD-10-CM

## 2023-11-13 PROCEDURE — 99214 OFFICE O/P EST MOD 30 MIN: CPT | Mod: GC

## 2023-11-13 RX ORDER — FAMOTIDINE 10 MG
10 TABLET ORAL 2 TIMES DAILY
Qty: 180 TABLET | Refills: 3 | Status: SHIPPED | OUTPATIENT
Start: 2023-11-13 | End: 2024-06-03

## 2023-11-13 RX ORDER — METFORMIN HYDROCHLORIDE 750 MG/1
750 TABLET, EXTENDED RELEASE ORAL
Qty: 90 TABLET | Refills: 3 | Status: SHIPPED | OUTPATIENT
Start: 2023-11-13 | End: 2023-12-12

## 2023-11-13 RX ORDER — FAMOTIDINE 10 MG
10 TABLET ORAL 2 TIMES DAILY
Qty: 60 TABLET | Refills: 0 | Status: SHIPPED | OUTPATIENT
Start: 2023-11-13 | End: 2023-11-13

## 2023-11-13 RX ORDER — AMLODIPINE AND BENAZEPRIL HYDROCHLORIDE 5; 20 MG/1; MG/1
1 CAPSULE ORAL DAILY
Qty: 90 CAPSULE | Refills: 3 | Status: SHIPPED | OUTPATIENT
Start: 2023-11-13 | End: 2023-12-12

## 2023-11-13 RX ORDER — ONDANSETRON 4 MG/1
4 TABLET, ORALLY DISINTEGRATING ORAL EVERY 8 HOURS PRN
Qty: 20 TABLET | Refills: 0 | Status: SHIPPED | OUTPATIENT
Start: 2023-11-13 | End: 2024-06-03

## 2023-11-13 NOTE — PROGRESS NOTES
Assessment & Plan     Gastroesophageal reflux disease without esophagitis  Nausea and vomiting, unspecified vomiting type  Chloe was seen two weeks ago for reflux symptoms; at that time, famotidine was started. She notes improvement in her reflux symptoms with use of famotidine, however, she also notes new onset of nausea/vomiting over the weekend. Possible that her nausea and vomiting without abdominal pain is a simple viral gastritis, but also could be a manifestation of her GERD, raising suspicion for an H. Pylori component. Will refill her Pepcid and test for H. Pylori.   - famotidine (PEPCID) 10 MG tablet BID   - Helicobacter pylori Antigen Stool  - ondansetron (ZOFRAN ODT) 4 MG ODT tab  Dispense: 20 tablet; Refill: 0    Other constipation  Symptoms improved with regular Miralax use. She is now having daily soft bowel movements without strain. Discussed how to adjust her Miralax use based on bowel movement frequency/consistency.     Screen for colon cancer  Agrees to colon cancer screening today, has not yet had a colonoscopy. She notes that transportation is the limiting factor. Will reach out to clinic social work to see if assistance with a medical ride to this appointment can be arranged.   - Colonoscopy Screening  Referral    Type 2 diabetes mellitus without complication, without long-term current use of insulin (H)  Patient's A1c was elevated to 7.9 at last visit, up from 7.2 previously. She has been on metformin 750 XR for a long time, but notes that she ran out three months ago. Discussed that there were refills available, and I'm not sure why she ran into trouble having access to them. Will refill again today, and recheck A1c in three months; suspect that this elevation in her A1c is secondary to running out of metformin.   - metFORMIN (GLUCOPHAGE-XR) 750 MG 24 hr tablet  Dispense: 90 tablet; Refill: 3    Benign essential hypertension  Patient's blood pressure is above goal today at 143/81.  Similar to above, she ran out of her amlodipine-benazepril combo pill a few months ago and did not get the refill. Will restart the medication and recheck blood pressure again in one month.   - amLODIPine-benazepril (LOTREL) 5-20 MG capsule  Dispense: 90 capsule; Refill: 3    Hypertriglyceridemia  Mixed hyperlipidemia  Patient's cholesterol is elevated on repeat check; currently prescribed atorvastatin, ezetimibe and fenofibrate. Concerned that she is possibly not taking all of these medications. Instructed her to bring these medications to clinic at her next visit to we can make sure she has everything she needs to treat her cholesterol. Would consider increasing atorvastatin dose (currently at 40 mg) and introducing fish oil if she is taking all of the above medications and her cholesterol remains uncontrolled.     Return in about 4 weeks (around 12/11/2023).    Juanita Lopez MD  [facility]    Gregor Corona is a 63 year old, presenting for the following health issues:  RECHECK (GERD AND CONSTIPATION, NOT FEELING TOO WELL TODAY, HAD VOMITING AND SOME BODY ACHE)        11/13/2023     8:51 AM   Additional Questions   Roomed by Anish   Accompanied by ANGELITA BURRELL     Follow-up constipation and reflux  - At last appointment, patient noted symptoms of constipation and GERD for the past month  - Prescribed Miralax and famotidine   - GERD symptoms since last visit: She notes that her symptoms have improved; she hasn't noted much for symptoms since she started the famotidine.   - Bowel movements since last visit: Since starting the Miralax, she has noted improvement in her stools. She is no longer straining. She takes the medication daily, and has been having two bowel movements a day. These bowel movements are soft.   - Further consideration of colon cancer screening: Agrees to colonoscopy     Nausea/vomiting  - Over the past couple of days, she has been vomiting and feeling nauseous  -  "She was vomiting more frequently on Friday and Saturday. Improved on Sunday and Monday.   - Abdominal pain: None  - Fever: None     Diabetes  - Notes that she stopped taking the metformin because she ran out. Ran out about 2-3 months ago.   - Last A1c was 7.9, previously had been 7.2    HTN   - She stopped taking her blood pressure medicine because she ran out. Ran out about 2-3 months ago.   - Blood pressure today is elevated today, and was at the last appointment as well.    Hyperlipidemia  - Cholesterol was checked at last appointment, found to be persistently elevated.   - She on atorvastatin, fenofibrate and Zetia   - She has ran out of multiple medications, unclear what she is currently taking.     Review of Systems   Positive for nausea and vomiting, negative for abdominal pain, chest pain, shortness of breath, fever      Objective    BP (!) 143/81   Pulse 75   Temp 97  F (36.1  C)   Resp 22   Ht 1.473 m (4' 10\")   Wt 60.3 kg (133 lb)   SpO2 98%   BMI 27.80 kg/m    Body mass index is 27.8 kg/m .  Physical Exam     GENERAL: healthy, alert and no distress  EYES: Eyes grossly normal to inspection, PERRL and conjunctivae and sclerae normal  RESP: lungs clear to auscultation - no rales, rhonchi or wheezes  CV: regular rate and rhythm  ABDOMEN: soft, nontender, no hepatosplenomegaly, no masses and bowel sounds normal  PSYCH: mentation appears normal, affect normal/bright                  "

## 2023-11-13 NOTE — PROGRESS NOTES
Preceptor Attestation:  Patient's case reviewed and discussed with the resident, Juanita Lopez MD, and I personally evaluated the patient. I agree with written assessment and plan of care.    Supervising Physician:  Shruthi Soria MD   [facility]

## 2023-11-14 PROCEDURE — 87338 HPYLORI STOOL AG IA: CPT

## 2023-11-15 LAB — H PYLORI AG STL QL IA: POSITIVE

## 2023-11-17 ENCOUNTER — TELEPHONE (OUTPATIENT)
Dept: FAMILY MEDICINE | Facility: CLINIC | Age: 63
End: 2023-11-17
Payer: COMMERCIAL

## 2023-11-17 DIAGNOSIS — A04.8 H. PYLORI INFECTION: Primary | ICD-10-CM

## 2023-11-17 RX ORDER — OMEPRAZOLE 20 MG/1
20 TABLET, DELAYED RELEASE ORAL 2 TIMES DAILY
Qty: 28 TABLET | Refills: 0 | Status: SHIPPED | OUTPATIENT
Start: 2023-11-17 | End: 2024-06-03

## 2023-11-17 RX ORDER — BISMUTH SUBCITRATE POTASSIUM, METRONIDAZOLE, TETRACYCLINE HYDROCHLORIDE 140; 125; 125 MG/1; MG/1; MG/1
3 CAPSULE ORAL
Qty: 168 CAPSULE | Refills: 0 | Status: SHIPPED | OUTPATIENT
Start: 2023-11-17 | End: 2024-06-03

## 2023-11-17 NOTE — TELEPHONE ENCOUNTER
Discussed the following     Your lab test returned showing the presence of a bacteria called H. Pylori. This bacteria can cause irritation of the stomach lining and in some cases, ulcers of the stomach lining. This is a common infection, but does take a lot of medications working together to treat this infection. We will start you on a therapy regimen that involves taking medicines four times a day.    -  The first pill is one that combines three different types of medicines; it is called Pylera. You will take three capsules four times a day. Take the three capsules after each meal and at bedtime. You will do this for 14 days.  -  The second pill is your omeprazole. You will take 20 mg twice a day.   - Avoid alcohol while taking these medications. One component of the Pylera pill is a medicine called metronidazole, which can make people feel crummy while drinking alcohol. In addition, the bismuth in the Pylera can make your poop turn black, which is normal while taking this medicine.     Please come back for follow-up four weeks after completing this medication regimen, where we will check to see if the treatment cleared the infection.     Patient agrees to this plan and will start the medication regimen tomorrow.    Juanita Lopez MD  [facility] [place], [place] Residency, PGY-3

## 2023-12-12 ENCOUNTER — OFFICE VISIT (OUTPATIENT)
Dept: FAMILY MEDICINE | Facility: CLINIC | Age: 63
End: 2023-12-12
Payer: COMMERCIAL

## 2023-12-12 VITALS
WEIGHT: 134 LBS | OXYGEN SATURATION: 98 % | DIASTOLIC BLOOD PRESSURE: 86 MMHG | RESPIRATION RATE: 22 BRPM | HEART RATE: 66 BPM | BODY MASS INDEX: 28.13 KG/M2 | TEMPERATURE: 98.4 F | HEIGHT: 58 IN | SYSTOLIC BLOOD PRESSURE: 155 MMHG

## 2023-12-12 DIAGNOSIS — E11.9 TYPE 2 DIABETES MELLITUS WITHOUT COMPLICATION, WITHOUT LONG-TERM CURRENT USE OF INSULIN (H): ICD-10-CM

## 2023-12-12 DIAGNOSIS — A04.8 H. PYLORI INFECTION: ICD-10-CM

## 2023-12-12 DIAGNOSIS — Z12.11 SCREEN FOR COLON CANCER: ICD-10-CM

## 2023-12-12 DIAGNOSIS — Z00.00 HEALTH CARE MAINTENANCE: ICD-10-CM

## 2023-12-12 DIAGNOSIS — I10 BENIGN ESSENTIAL HYPERTENSION: Primary | ICD-10-CM

## 2023-12-12 PROCEDURE — 99214 OFFICE O/P EST MOD 30 MIN: CPT | Mod: GC

## 2023-12-12 RX ORDER — METFORMIN HYDROCHLORIDE 750 MG/1
750 TABLET, EXTENDED RELEASE ORAL
Qty: 60 TABLET | Refills: 3 | Status: SHIPPED | OUTPATIENT
Start: 2023-12-12 | End: 2024-06-04

## 2023-12-12 RX ORDER — AMLODIPINE AND BENAZEPRIL HYDROCHLORIDE 5; 20 MG/1; MG/1
1 CAPSULE ORAL DAILY
Qty: 90 CAPSULE | Refills: 3 | Status: SHIPPED | OUTPATIENT
Start: 2023-12-12 | End: 2024-07-03

## 2023-12-12 NOTE — PROGRESS NOTES
Assessment & Plan     Benign essential hypertension  Patient presented for blood pressure recheck after her amlodipine-benazepril was refilled at last appointment after she noted to be out the last three months. She is above goal today at 155/86. She notes that the pharmacy did not give her the medication so she has not been taking anything for blood pressure. Will refill today, and instructed her that if the pharmacy does not have the medication ready for her tomorrow, she needs to let us know so that we can help her problem solve.   - Refilled amlodipine-benazepril today  - Follow-up in four weeks    H. pylori infection  Chloe tested positive for H. Pylori on 11/14, started quad therapy on 11/17. She has been taking only the sets twice daily instead of four times daily due to diarrhea with increased frequency. Discussed continuing to take the medication until it runs out, and will plan to retest for eradication four weeks after that.     Type 2 diabetes mellitus without complication, without long-term current use of insulin (H)  Patient's metformin was refilled at last appointment because she stated that she had been out for three months. She notes that the pharmacy did not give her the medication. Will refill today, and instructed her that if the pharmacy does not have the medication ready for her tomorrow, she needs to let us know so that we can help her problem solve.   - Metformin  mg refilled today     Health care maintenance  - Declines PCV 20 vaccine today  - Agreed to colonoscopy at last visit, but needs medical rides. Confirmed with clinic SW that she qualifies, which she does. Will schedule colonoscopy today with referral specialist, then have social work reach out about ride.     Return in about 4 weeks (around 1/9/2024).    Juanita Lopez MD  [facility]    Subjective   Chloe is a 63 year old, presenting for the following health issues:  RECHECK (BP and GERD)    HPI  "    HTN follow up  - Medications: amlodipine-benazepril - NOT TAKING   - Any recent changes: Refilled at last appointment because she had run out, but she states that they did not give it to her at the pharmacy   - Low BP sx: Denies lightheadedness, orthostatic hypotension  - High BP sx: Denies headache, visual blurring, chest pain, SOB, leg swelling    H. Pylori  - Xee tested positive for H. Pylori on 11/14, started quad therapy on 11/17  - Epigastric pain has resolved.   - She has been taking only two pills at each meal instead of three with each meal. Struggled with diarrhea after taking the increased dose.   - Was scheduled to complete therapy at the beginning of December; but has not yet finished due to taking decreased dose.      Review of Systems   Constitutional, HEENT, cardiovascular, pulmonary, gi and gu systems are negative, except as otherwise noted.      Objective    BP (!) 155/86   Pulse 66   Temp 98.4  F (36.9  C)   Resp 22   Ht 1.473 m (4' 10\")   Wt 60.8 kg (134 lb)   SpO2 98%   BMI 28.01 kg/m    Body mass index is 28.01 kg/m .  Physical Exam     GENERAL: healthy, alert and no distress  EYES: Eyes grossly normal to inspection  RESP: lungs clear to auscultation - no rales, rhonchi or wheezes  CV: regular rate and rhythm  ABDOMEN: soft, nontender, no hepatosplenomegaly  MS: no gross musculoskeletal defects noted, no edema  PSYCH: mentation appears normal, affect normal/bright                  "

## 2024-01-17 ENCOUNTER — OFFICE VISIT (OUTPATIENT)
Dept: FAMILY MEDICINE | Facility: CLINIC | Age: 64
End: 2024-01-17
Payer: COMMERCIAL

## 2024-01-17 VITALS
TEMPERATURE: 98 F | RESPIRATION RATE: 20 BRPM | HEART RATE: 79 BPM | BODY MASS INDEX: 28.13 KG/M2 | HEIGHT: 58 IN | SYSTOLIC BLOOD PRESSURE: 117 MMHG | OXYGEN SATURATION: 97 % | WEIGHT: 134 LBS | DIASTOLIC BLOOD PRESSURE: 70 MMHG

## 2024-01-17 DIAGNOSIS — M54.2 NECK PAIN: ICD-10-CM

## 2024-01-17 DIAGNOSIS — R20.0 NUMBNESS AND TINGLING IN LEFT HAND: ICD-10-CM

## 2024-01-17 DIAGNOSIS — R20.2 NUMBNESS AND TINGLING IN LEFT HAND: ICD-10-CM

## 2024-01-17 DIAGNOSIS — I10 BENIGN ESSENTIAL HYPERTENSION: Primary | ICD-10-CM

## 2024-01-17 DIAGNOSIS — A04.8 H. PYLORI INFECTION: ICD-10-CM

## 2024-01-17 PROCEDURE — 99214 OFFICE O/P EST MOD 30 MIN: CPT | Mod: GC

## 2024-01-17 RX ORDER — GABAPENTIN 300 MG/1
300 CAPSULE ORAL AT BEDTIME
Qty: 90 CAPSULE | Refills: 1 | Status: SHIPPED | OUTPATIENT
Start: 2024-01-17 | End: 2024-07-19

## 2024-01-17 RX ORDER — ACETAMINOPHEN 500 MG
500-1000 TABLET ORAL EVERY 6 HOURS PRN
Qty: 200 TABLET | Refills: 1 | Status: SHIPPED | OUTPATIENT
Start: 2024-01-17

## 2024-01-17 NOTE — PROGRESS NOTES
Assessment & Plan     Benign essential hypertension  Patient presents for hypertension follow-up. Blood pressure is at goal today in clinic. Current medications include: amlodipine-benazepril. No changes to regimen today. Patient has refills for the next year.     H. pylori infection  Chloe tested positive for H. Pylori on 11/14, started quad therapy on 11/17. She has been taking only the sets twice daily instead of four times daily due to diarrhea with increased frequency. Discussed continuing to take the medication until it runs out; she finished treatment last week. Discussed the importance of testing for cure. Will send patient home with stool kit, and she will return it at her next appointment in 6 weeks. Denies any ongoing epigastric pain, nausea or vomiting.   - Helicobacter pylori Antigen Stool    Neck pain  Requested refills of pain medication.   - gabapentin (NEURONTIN) 300 MG capsule; Take 1 capsule (300 mg) by mouth at bedtime  - acetaminophen (TYLENOL) 500 MG tablet; Take 1-2 tablets (500-1,000 mg) by mouth every 6 hours as needed for mild pain    Return in about 2 months (around 3/17/2024).    Subjective   Chloe is a 63 year old, presenting for the following health issues:  RECHECK (BP)      1/17/2024     2:26 PM   Additional Questions   Roomed by DYLAN BURRELL     Hypertension follow-up  - Patient seen for blood pressure on 12/12/23; noted to be elevated at that time, but she had been out of her medication (amlodipine-benazepril ). Refilled at that time.   - Blood pressure today: at goal at 117/70  - Taking medication consistently: Taking daily without issues.   - Symptoms: Denies chest pain, shortness of breath, leg swelling.     H. Pylori  - Chloe tested positive for H. Pylori on 11/14, started quad therapy on 11/17. She has been taking only the sets twice daily instead of four times daily due to diarrhea with increased frequency. Discussed continuing to take the medication until it runs out, and will  "plan to retest for eradication four weeks after that.   - Medication course: finished the course of medication - completed the course roughly one month ago.   - Notes that she struggled taking it because the medication caused her to have back pain. Now that she has finished the course, denies pain.   - Currently denies epigastric pain, nausea or vomiting.       Objective    /70   Pulse 79   Temp 98  F (36.7  C)   Resp 20   Ht 1.473 m (4' 10\")   Wt 60.8 kg (134 lb)   SpO2 97%   BMI 28.01 kg/m    Body mass index is 28.01 kg/m .    Review of Systems  Constitutional, HEENT, cardiovascular, pulmonary, gi and gu systems are negative, except as otherwise noted.    Physical Exam     GENERAL: Healthy, alert and no distress  EYES: Eyes grossly normal to inspection.  No discharge or erythema, or obvious scleral/conjunctival abnormalities.  RESP: No audible wheeze, cough, or visible cyanosis.  No visible retractions or increased work of breathing.    SKIN: Visible skin clear. No significant rash, abnormal pigmentation or lesions.  NEURO: Cranial nerves grossly intact.  Mentation and speech appropriate for age.  PSYCH: Mentation appears normal, affect normal/bright, judgement and insight intact, normal speech and appearance well-groomed.      Signed Electronically by: Juanita Lopez MD  "

## 2024-01-25 NOTE — PROGRESS NOTES
Preceptor Attestation:   Patient seen, evaluated and discussed with the resident. I have verified the content of the note, which accurately reflects my assessment of the patient and the plan of care.    Supervising Physician:Milton Benítez MD    [facility]

## 2024-01-26 RX ORDER — BLOOD SUGAR DIAGNOSTIC
STRIP MISCELLANEOUS
COMMUNITY
End: 2024-03-11

## 2024-01-26 NOTE — TELEPHONE ENCOUNTER
[facility] phone call message- medication clarification/question:    Full Medication Name: Test strips     Question: Patient requesting for refill on test strips.. Writer confirmed with patient if she was ever prescribed/given that from our clinic, patient stated yes, she got it prescribed from us.. Please refill or else call and advise patient.      Pharmacy confirmed as [facility] - [place], [place] - [address]: Yes    OK to leave a message on voice mail? Yes    Primary language: Hmong      needed? Yes    Call taken on January 26, 2024 at 12:56 PM by Miriam Hill

## 2024-01-29 DIAGNOSIS — E11.9 TYPE 2 DIABETES MELLITUS WITHOUT COMPLICATION, WITHOUT LONG-TERM CURRENT USE OF INSULIN (H): Primary | ICD-10-CM

## 2024-01-29 RX ORDER — BLOOD SUGAR DIAGNOSTIC
STRIP MISCELLANEOUS
OUTPATIENT
Start: 2024-01-29

## 2024-03-11 DIAGNOSIS — E11.9 TYPE 2 DIABETES MELLITUS WITHOUT COMPLICATION, WITHOUT LONG-TERM CURRENT USE OF INSULIN (H): Primary | ICD-10-CM

## 2024-03-11 PROCEDURE — 87338 HPYLORI STOOL AG IA: CPT

## 2024-03-11 NOTE — TELEPHONE ENCOUNTER
[facility] phone call message- medication clarification/question:    Full Medication Name: blood glucose (NO BRAND SPECIFIED) test strip     Question: Patient requesting refill on prescription listed above.      Pharmacy confirmed as [facility] - [place], [place] - [address]: Yes    OK to leave a message on voice mail? Yes    Primary language: Hmong      needed? Yes    Call taken on March 11, 2024 at 10:53 AM by Miriam Hill

## 2024-03-12 LAB — H PYLORI AG STL QL IA: POSITIVE

## 2024-03-12 RX ORDER — BLOOD SUGAR DIAGNOSTIC
1 STRIP MISCELLANEOUS DAILY
Qty: 100 STRIP | Refills: 3 | Status: SHIPPED | OUTPATIENT
Start: 2024-03-12

## 2024-03-20 ENCOUNTER — APPOINTMENT (OUTPATIENT)
Dept: INTERPRETER SERVICES | Facility: CLINIC | Age: 64
End: 2024-03-20
Payer: COMMERCIAL

## 2024-03-20 DIAGNOSIS — A04.8 H. PYLORI INFECTION: Primary | ICD-10-CM

## 2024-03-20 RX ORDER — OMEPRAZOLE 40 MG/1
40 CAPSULE, DELAYED RELEASE ORAL 2 TIMES DAILY
Qty: 28 CAPSULE | Refills: 0 | Status: SHIPPED | OUTPATIENT
Start: 2024-03-20 | End: 2024-06-03

## 2024-03-20 RX ORDER — AMOXICILLIN 500 MG/1
1000 CAPSULE ORAL 3 TIMES DAILY
Qty: 84 CAPSULE | Refills: 0 | Status: SHIPPED | OUTPATIENT
Start: 2024-03-20 | End: 2024-06-03

## 2024-04-04 DIAGNOSIS — E78.00 PURE HYPERCHOLESTEROLEMIA: ICD-10-CM

## 2024-04-05 RX ORDER — ATORVASTATIN CALCIUM 40 MG/1
TABLET, FILM COATED ORAL
Qty: 90 TABLET | Refills: 3 | Status: SHIPPED | OUTPATIENT
Start: 2024-04-05 | End: 2024-06-03

## 2024-05-02 DIAGNOSIS — E78.00 PURE HYPERCHOLESTEROLEMIA: ICD-10-CM

## 2024-05-02 RX ORDER — EZETIMIBE 10 MG/1
TABLET ORAL
Qty: 90 TABLET | Refills: 3 | Status: SHIPPED | OUTPATIENT
Start: 2024-05-02 | End: 2024-06-03

## 2024-06-03 ENCOUNTER — OFFICE VISIT (OUTPATIENT)
Dept: FAMILY MEDICINE | Facility: CLINIC | Age: 64
End: 2024-06-03
Payer: COMMERCIAL

## 2024-06-03 DIAGNOSIS — K29.50 OTHER CHRONIC GASTRITIS WITHOUT HEMORRHAGE: ICD-10-CM

## 2024-06-03 DIAGNOSIS — E11.9 TYPE 2 DIABETES MELLITUS WITHOUT COMPLICATION, WITHOUT LONG-TERM CURRENT USE OF INSULIN (H): Primary | ICD-10-CM

## 2024-06-03 DIAGNOSIS — Z11.59 NEED FOR HEPATITIS C SCREENING TEST: ICD-10-CM

## 2024-06-03 DIAGNOSIS — H53.8 BLURRED VISION: ICD-10-CM

## 2024-06-03 DIAGNOSIS — E78.00 PURE HYPERCHOLESTEROLEMIA: ICD-10-CM

## 2024-06-03 DIAGNOSIS — Z11.4 SCREENING FOR HIV (HUMAN IMMUNODEFICIENCY VIRUS): ICD-10-CM

## 2024-06-03 LAB — HBA1C MFR BLD: 7.2 % (ref 0–5.6)

## 2024-06-03 PROCEDURE — 86803 HEPATITIS C AB TEST: CPT

## 2024-06-03 PROCEDURE — 80048 BASIC METABOLIC PNL TOTAL CA: CPT

## 2024-06-03 PROCEDURE — 83036 HEMOGLOBIN GLYCOSYLATED A1C: CPT

## 2024-06-03 PROCEDURE — 36415 COLL VENOUS BLD VENIPUNCTURE: CPT

## 2024-06-03 PROCEDURE — 99214 OFFICE O/P EST MOD 30 MIN: CPT | Mod: GC

## 2024-06-03 PROCEDURE — G2211 COMPLEX E/M VISIT ADD ON: HCPCS

## 2024-06-03 PROCEDURE — 87389 HIV-1 AG W/HIV-1&-2 AB AG IA: CPT

## 2024-06-03 RX ORDER — EZETIMIBE 10 MG/1
10 TABLET ORAL DAILY
Qty: 90 TABLET | Refills: 3 | Status: SHIPPED | OUTPATIENT
Start: 2024-06-03

## 2024-06-03 RX ORDER — OMEPRAZOLE 20 MG/1
20 TABLET, DELAYED RELEASE ORAL DAILY
Qty: 90 TABLET | Refills: 3 | Status: SHIPPED | OUTPATIENT
Start: 2024-06-03

## 2024-06-03 RX ORDER — ATORVASTATIN CALCIUM 40 MG/1
40 TABLET, FILM COATED ORAL DAILY
Qty: 90 TABLET | Refills: 3 | Status: SHIPPED | OUTPATIENT
Start: 2024-06-03

## 2024-06-03 NOTE — LETTER
June 5, 2024      Chloe Silva  [address]  [facility] [address]        Dear ,    We are writing to inform you of your test results.    Thanks for coming in for a lab check this week. Your diabetes lab continues to be at goal at 7.2, so please continue to take your metformin as you are doing. Your hepatitis C and HIV test were both negative, meaning you do not have hepatitis C or HIV at this time. Your kidney function and electrolytes are normal.     Please let us know if you have any questions on these results!        Resulted Orders   HIV Screening   Result Value Ref Range    HIV Antigen Antibody Combo Nonreactive Nonreactive      Comment:      Negative HIV-1 p24 antigen and HIV-1/2 antibody screening test results usually indicate the absence of HIV-1 and HIV-2 infection. However, such negative results do not rule-out acute HIV infection.  If acute HIV-1 or HIV-2 infection is suspected, detection of HIV-1 or HIV-2 RNA  is recommended.    Hepatitis C Screen Reflex to HCV RNA Quant and Genotype   Result Value Ref Range    Hepatitis C Antibody Nonreactive Nonreactive      Comment:      A nonreactive screening test result does not exclude the possibility of exposure to or infection with HCV. Nonreactive screening test results in individuals with prior exposure to HCV may be due to antibody levels below the limit of detection of this assay or lack of reactivity to the HCV antigens used in this assay. Patients with recent HCV infections (<3 months from time of exposure) may have false-negative HCV antibody results due to the time needed for seroconversion (average of 8 to 9 weeks).   HEMOGLOBIN A1C   Result Value Ref Range    Hemoglobin A1C 7.2 (H) 0.0 - 5.6 %      Comment:      Normal <5.7%   Prediabetes 5.7-6.4%    Diabetes 6.5% or higher     Note: Adopted from ADA consensus guidelines.   BASIC METABOLIC PANEL   Result Value Ref Range    Sodium 141 135 - 145 mmol/L      Comment:      Reference  intervals for this test were updated on 09/26/2023 to more accurately reflect our healthy population. There may be differences in the flagging of prior results with similar values performed with this method. Interpretation of those prior results can be made in the context of the updated reference intervals.     Potassium 4.2 3.4 - 5.3 mmol/L    Chloride 104 98 - 107 mmol/L    Carbon Dioxide (CO2) 26 22 - 29 mmol/L    Anion Gap 11 7 - 15 mmol/L    Urea Nitrogen 10.3 8.0 - 23.0 mg/dL    Creatinine 0.58 0.51 - 0.95 mg/dL    GFR Estimate >90 >60 mL/min/1.73m2    Calcium 9.7 8.8 - 10.2 mg/dL    Glucose 92 70 - 99 mg/dL       If you have any questions or concerns, please call the clinic at the number listed above.       Sincerely,      Milton Benítez MD

## 2024-06-03 NOTE — PROGRESS NOTES
Assessment & Plan     Type 2 diabetes mellitus without complication, without long-term current use of insulin (H)  Patient presents today for diabetes follow-up. Current A1c is at goal at 7.2. No noted episodes of hypoglycemia. Medication changes today included: None - refilled metformin without change. Emphasized the importance of healthy diet and exercise. Also referred for diabetic eye exam.   - Adult Eye  Referral  - HEMOGLOBIN A1C  - Refilled metformin today    Blurred vision  Notes 2-3 years of blurry vision in her left eye, notes decreased central vision, maintaining peripheral vision. No pain or floaters and is not acute in nature, decreasing my concern today. She has never been seen by an eye doctor. Will refer for comprehensive eye exam today.  - Adult Eye  Referral    Screening for HIV (human immunodeficiency virus)  Agrees to routine screening.   - HIV Screening    Need for hepatitis C screening test  Agrees to routine screening.   - Hepatitis C Screen Reflex to HCV RNA Quant and Genotype    Pure hypercholesterolemia  Requests cholesterol medication refills.   - ezetimibe (ZETIA) 10 MG tablet  Dispense: 90 tablet; Refill: 3  - atorvastatin (LIPITOR) 40 MG tablet  Dispense: 90 tablet; Refill: 3    Other chronic gastritis without hemorrhage  Notes that the omeprazole continues to help with her abdominal pain, notes reflux if she does not take it. Will refill today  - omeprazole (PRILOSEC OTC) 20 MG EC tablet  Dispense: 90 tablet; Refill: 3      Subjective   Chloe is a 63 year old, presenting for the following health issues:  RECHECK (DM - Glucose been high recently, like to change to different Rx for DM if possible. ) and Eye Problem (Been blurry for a while now. Have not seen eye doctor recently for yearly eye check up)        6/3/2024    11:25 AM   Additional Questions   Roomed by Anish   Accompanied by ANGELITA         6/3/2024    Information    services provided? Yes    Language Hmong   Type of interpretation provided Face-to-face    name Marisol Beach    Agency [employer]    phone number [phone]     HPI     Blurry vision  - Onset: 2-3 years ago   - Laterality: Left side  - No pain  - No floaters   - Feels like there is a spot in the center of her eye where she can't see.   - Has never seen the eye doctor.     DM follow up  - Current medications: Metformin 750 mg once daily   - Any recent medication changes: No  - Diet: Has cut down on meat, eats more rice and veggies. Drinks a lot of water.   - BG values: Checks about once a week - usually 115-120   - Last A1C: 7.2 one year ago   - Last Urine Microalbumin: Normal last year, will recheck again this year   - Hypoglycemia sx: Denies headaches, lightheadedness, confusion, diaphoresis, tremor, palpitations      Objective    There were no vitals taken for this visit.  There is no height or weight on file to calculate BMI.  Physical Exam     GENERAL: alert and no distress  EYES: Eyes grossly normal to inspection, PERRL and conjunctivae and sclerae normal. Intact peripheral vision, notes decreased acuity when finger centrally in front of left eye.   RESP: No increased work of breathing noted.       Signed Electronically by: Juanita Lopez MD

## 2024-06-04 PROBLEM — E11.9 TYPE 2 DIABETES MELLITUS WITHOUT COMPLICATION, WITHOUT LONG-TERM CURRENT USE OF INSULIN (H): Status: ACTIVE | Noted: 2024-06-04

## 2024-06-04 LAB
ANION GAP SERPL CALCULATED.3IONS-SCNC: 11 MMOL/L (ref 7–15)
BUN SERPL-MCNC: 10.3 MG/DL (ref 8–23)
CALCIUM SERPL-MCNC: 9.7 MG/DL (ref 8.8–10.2)
CHLORIDE SERPL-SCNC: 104 MMOL/L (ref 98–107)
CREAT SERPL-MCNC: 0.58 MG/DL (ref 0.51–0.95)
DEPRECATED HCO3 PLAS-SCNC: 26 MMOL/L (ref 22–29)
EGFRCR SERPLBLD CKD-EPI 2021: >90 ML/MIN/1.73M2
GLUCOSE SERPL-MCNC: 92 MG/DL (ref 70–99)
HCV AB SERPL QL IA: NONREACTIVE
HIV 1+2 AB+HIV1 P24 AG SERPL QL IA: NONREACTIVE
POTASSIUM SERPL-SCNC: 4.2 MMOL/L (ref 3.4–5.3)
SODIUM SERPL-SCNC: 141 MMOL/L (ref 135–145)

## 2024-06-04 RX ORDER — METFORMIN HYDROCHLORIDE 750 MG/1
750 TABLET, EXTENDED RELEASE ORAL
Qty: 90 TABLET | Refills: 3 | Status: SHIPPED | OUTPATIENT
Start: 2024-06-04

## 2024-06-04 NOTE — PROGRESS NOTES
Preceptor Attestation:   Patient seen, evaluated and discussed with the resident. I have verified the content of the note, which accurately reflects my assessment of the patient and the plan of care.    The longitudinal plan of care for the diagnosis(es)/condition(s) as documented were addressed during this visit. Due to the added complexity in care, I will continue to support Xee in the subsequent management and with ongoing continuity of care.    Supervising Physician:Milton Benítez MD    [facility]

## 2024-07-01 DIAGNOSIS — E78.00 PURE HYPERCHOLESTEROLEMIA: ICD-10-CM

## 2024-07-01 RX ORDER — FENOFIBRATE 145 MG/1
TABLET, COATED ORAL
Qty: 90 TABLET | Refills: 0 | Status: SHIPPED | OUTPATIENT
Start: 2024-07-01

## 2024-07-02 ENCOUNTER — PATIENT OUTREACH (OUTPATIENT)
Dept: CARE COORDINATION | Facility: CLINIC | Age: 64
End: 2024-07-02
Payer: COMMERCIAL

## 2024-07-02 NOTE — PROGRESS NOTES
Clinic Care Coordination Contact  Follow Up Progress Note      Assessment: I got a message from  about the pt. He wanted me to reach out to the pt and help her schedule a follow up. I called and talked to the pt, I told the pt that  wanted me to call her and help her schedule a follow up. Pt agreed and I was able to get the pt in tomorrow at 3:00pm with .    Care Gaps:    Health Maintenance Due   Topic Date Due    DEXA  Never done    ADVANCE CARE PLANNING  Never done    EYE EXAM  Never done    MAMMO SCREENING  Never done    Pneumococcal Vaccine: 65+ Years (1 of 2 - PCV) Never done    COLORECTAL CANCER SCREENING  Never done    DTAP/TDAP/TD IMMUNIZATION (1 - Tdap) 12/30/2004    ZOSTER IMMUNIZATION (1 of 2) Never done    RSV VACCINE (Pregnancy & 60+) (1 - 1-dose 60+ series) Never done    MEDICARE ANNUAL WELLNESS VISIT  Never done    FALL RISK ASSESSMENT  Never done    COVID-19 Vaccine (5 - 2023-24 season) 02/29/2024    MICROALBUMIN  04/12/2024    DIABETIC FOOT EXAM  04/19/2024           Care Plans      Intervention/Education provided during outreach:             Plan:     Care Coordinator will follow up in

## 2024-07-03 ENCOUNTER — OFFICE VISIT (OUTPATIENT)
Dept: FAMILY MEDICINE | Facility: CLINIC | Age: 64
End: 2024-07-03
Payer: COMMERCIAL

## 2024-07-03 VITALS
WEIGHT: 133 LBS | HEART RATE: 79 BPM | TEMPERATURE: 98.1 F | OXYGEN SATURATION: 98 % | BODY MASS INDEX: 27.8 KG/M2 | RESPIRATION RATE: 18 BRPM | DIASTOLIC BLOOD PRESSURE: 94 MMHG | SYSTOLIC BLOOD PRESSURE: 174 MMHG

## 2024-07-03 DIAGNOSIS — E11.9 TYPE 2 DIABETES MELLITUS WITHOUT COMPLICATION, WITHOUT LONG-TERM CURRENT USE OF INSULIN (H): ICD-10-CM

## 2024-07-03 DIAGNOSIS — E78.1 HYPERTRIGLYCERIDEMIA: Primary | ICD-10-CM

## 2024-07-03 DIAGNOSIS — E78.2 MIXED HYPERLIPIDEMIA: ICD-10-CM

## 2024-07-03 DIAGNOSIS — I10 BENIGN ESSENTIAL HYPERTENSION: ICD-10-CM

## 2024-07-03 DIAGNOSIS — K59.00 CONSTIPATION, UNSPECIFIED CONSTIPATION TYPE: ICD-10-CM

## 2024-07-03 LAB
ERYTHROCYTE [DISTWIDTH] IN BLOOD BY AUTOMATED COUNT: 12.2 % (ref 10–15)
HCT VFR BLD AUTO: 42.5 % (ref 35–47)
HGB BLD-MCNC: 13.9 G/DL (ref 11.7–15.7)
MCH RBC QN AUTO: 29.3 PG (ref 26.5–33)
MCHC RBC AUTO-ENTMCNC: 32.7 G/DL (ref 31.5–36.5)
MCV RBC AUTO: 90 FL (ref 78–100)
PLATELET # BLD AUTO: 238 10E3/UL (ref 150–450)
RBC # BLD AUTO: 4.75 10E6/UL (ref 3.8–5.2)
WBC # BLD AUTO: 6.3 10E3/UL (ref 4–11)

## 2024-07-03 PROCEDURE — 85027 COMPLETE CBC AUTOMATED: CPT

## 2024-07-03 PROCEDURE — 99214 OFFICE O/P EST MOD 30 MIN: CPT | Mod: GC

## 2024-07-03 PROCEDURE — 80061 LIPID PANEL: CPT

## 2024-07-03 PROCEDURE — 80053 COMPREHEN METABOLIC PANEL: CPT

## 2024-07-03 PROCEDURE — 36415 COLL VENOUS BLD VENIPUNCTURE: CPT

## 2024-07-03 RX ORDER — AMLODIPINE AND BENAZEPRIL HYDROCHLORIDE 5; 20 MG/1; MG/1
1 CAPSULE ORAL DAILY
Qty: 90 CAPSULE | Refills: 3 | Status: SHIPPED | OUTPATIENT
Start: 2024-07-03

## 2024-07-03 RX ORDER — POLYETHYLENE GLYCOL 3350 17 G/17G
17 POWDER, FOR SOLUTION ORAL DAILY
Qty: 510 G | Refills: 0 | Status: SHIPPED | OUTPATIENT
Start: 2024-07-03

## 2024-07-03 NOTE — PROGRESS NOTES
"  Assessment & Plan     Chloe Silva is a 64 year old Hmong speaking female that has been established with Dr. Lopez and is here today for labs and to establish care with me as her new physician.    Mixed hyperlipidemia  Hypertriglyceridemia  - Comprehensive metabolic panel  - CBC with platelets  - Lipid panel reflex to direct LDL Fasting  If >500 triglycerides will repeat fasting    Type 2 diabetes mellitus without complication, without long-term current use of insulin (H)  - will look into how we can help her afford an eye appointment.    Benign essential hypertension  Ran out of amlodipine.   - follow-up in 2 months      BMI  Estimated body mass index is 27.8 kg/m  as calculated from the following:    Height as of 1/17/24: 1.473 m (4' 10\").    Weight as of this encounter: 60.3 kg (133 lb).       Subjective   Chloe is a 64 year old, presenting for the following health issues:  No chief complaint on file.    I received a refill request for fenofibrate.  She is a new patient to me and I have not been able to meet her yet so I had her schedule an appointment with me.  It has also been some time since her Liver enzymes were checked and I wanted to check these along with a CBC and Lipid panel.    Morning 163    Eye doctor said she would have to pay partial and she could not afford it.    Does not have cuff to check BP at home    Lives alone, family comes to visit        HPI       4[facility] Primary Care      Matters  Code Status: No Order      ADVANCED CARE DIRECTIVES   Interested in documentation    Medications  Medications of Risk:    Patient has no Opioid in Med List Patient has no Benzodiazepines on Med List Patient has no Antipsychotics on Med List  Number of medications on med list:    Med List Contains 10 or More Medications - Consider Medication Review and Reconciliation    Mentation  Patient has cognitive impairment diagnosis on Problem List  Patient does not have depression on Problem List    PHQ-2 Score:       " 6/3/2024    11:35 AM 1/17/2024     2:26 PM   PHQ-2 ( 1999 [employer])   Q1: Little interest or pleasure in doing things 0 0   Q2: Feeling down, depressed or hopeless 0 0   PHQ-2 Score 0 0   Q1: Little interest or pleasure in doing things Not at all    Q2: Feeling down, depressed or hopeless Not at all    PHQ-2 Score 0         Mobility  Refreshable SmartLink to last Falls Risk assessment score and date:   No data recorded   Does Patient or Caregiver have mobility concerns for the patient?:   Adaptive Equipment/DME that patient uses: No DME    General Risk Score: 3   Values used to calculate this score:    Points  Metrics       1        Age: 64       0        Hospital Admissions: 0       0        ED Visits: 0       0        Has Chronic Obstructive Pulmonary Disease: No       1        Has Diabetes: Yes       0        Has Congestive Heart Failure: No       0        Has Liver Disease: No       0        Has Depression: No       0        Current PCP: Long Aviles MD       1        Has Medicaid: Yes     Patient Health status: Healthy: functionally independent, less than three chronic conditions          Objective    There were no vitals taken for this visit.  There is no height or weight on file to calculate BMI.  Physical Exam   GENERAL: alert and no distress  RESP: lungs clear to auscultation - no rales, rhonchi or wheezes  CV: regular rate and rhythm, normal S1 S2, no S3 or S4, no murmur, click or rub  ABDOMEN: soft, nontender, no hepatosplenomegaly, no masses and bowel sounds normal  MS: no gross musculoskeletal defects noted            Signed Electronically by: Long Aviles MD

## 2024-07-03 NOTE — PROGRESS NOTES
Preceptor Attestation:   Patient seen, evaluated and discussed with the resident. I have verified the content of the note, which accurately reflects my assessment of the patient and the plan of care.    Supervising Physician:Thu Burkett MD    [facility]

## 2024-07-04 LAB
ALBUMIN SERPL BCG-MCNC: 4 G/DL (ref 3.5–5.2)
ALP SERPL-CCNC: 92 U/L (ref 40–150)
ALT SERPL W P-5'-P-CCNC: 31 U/L (ref 0–50)
ANION GAP SERPL CALCULATED.3IONS-SCNC: 11 MMOL/L (ref 7–15)
AST SERPL W P-5'-P-CCNC: 31 U/L (ref 0–45)
BILIRUB SERPL-MCNC: 0.3 MG/DL
BUN SERPL-MCNC: 10 MG/DL (ref 8–23)
CALCIUM SERPL-MCNC: 9.1 MG/DL (ref 8.8–10.2)
CHLORIDE SERPL-SCNC: 106 MMOL/L (ref 98–107)
CHOLEST SERPL-MCNC: 275 MG/DL
CREAT SERPL-MCNC: 0.5 MG/DL (ref 0.51–0.95)
DEPRECATED HCO3 PLAS-SCNC: 24 MMOL/L (ref 22–29)
EGFRCR SERPLBLD CKD-EPI 2021: >90 ML/MIN/1.73M2
GLUCOSE SERPL-MCNC: 184 MG/DL (ref 70–99)
HDLC SERPL-MCNC: 49 MG/DL
LDLC SERPL CALC-MCNC: 176 MG/DL
NONHDLC SERPL-MCNC: 226 MG/DL
POTASSIUM SERPL-SCNC: 3.6 MMOL/L (ref 3.4–5.3)
PROT SERPL-MCNC: 6.9 G/DL (ref 6.4–8.3)
SODIUM SERPL-SCNC: 141 MMOL/L (ref 135–145)
TRIGL SERPL-MCNC: 248 MG/DL

## 2024-07-11 ENCOUNTER — TRANSFERRED RECORDS (OUTPATIENT)
Dept: HEALTH INFORMATION MANAGEMENT | Facility: CLINIC | Age: 64
End: 2024-07-11

## 2024-07-18 DIAGNOSIS — R20.2 NUMBNESS AND TINGLING IN LEFT HAND: ICD-10-CM

## 2024-07-18 DIAGNOSIS — M54.2 NECK PAIN: ICD-10-CM

## 2024-07-18 DIAGNOSIS — R20.0 NUMBNESS AND TINGLING IN LEFT HAND: ICD-10-CM

## 2024-07-19 RX ORDER — GABAPENTIN 300 MG/1
CAPSULE ORAL
Qty: 30 CAPSULE | Refills: 1 | Status: SHIPPED | OUTPATIENT
Start: 2024-07-19

## 2024-11-15 ENCOUNTER — TELEPHONE (OUTPATIENT)
Dept: FAMILY MEDICINE | Facility: CLINIC | Age: 64
End: 2024-11-15

## 2024-11-15 NOTE — TELEPHONE ENCOUNTER
Patient Quality Outreach    Patient is due for the following:   Diabetes -  A1C  Breast Cancer Screening - Mammogram    Action(s) Taken:   Couldn't leave the voice massage. Will call again later.     Type of outreach:    Phone.     Questions for provider review:    None     Gabriela Say, CMA

## 2025-01-06 ENCOUNTER — OFFICE VISIT (OUTPATIENT)
Dept: FAMILY MEDICINE | Facility: CLINIC | Age: 65
End: 2025-01-06
Payer: COMMERCIAL

## 2025-01-06 VITALS
BODY MASS INDEX: 28.91 KG/M2 | HEIGHT: 57 IN | OXYGEN SATURATION: 97 % | SYSTOLIC BLOOD PRESSURE: 130 MMHG | HEART RATE: 71 BPM | WEIGHT: 134 LBS | TEMPERATURE: 98.3 F | DIASTOLIC BLOOD PRESSURE: 77 MMHG | RESPIRATION RATE: 20 BRPM

## 2025-01-06 DIAGNOSIS — I10 BENIGN ESSENTIAL HYPERTENSION: ICD-10-CM

## 2025-01-06 DIAGNOSIS — M54.2 NECK PAIN: ICD-10-CM

## 2025-01-06 DIAGNOSIS — Z23 NEED FOR PROPHYLACTIC VACCINATION AND INOCULATION AGAINST INFLUENZA: ICD-10-CM

## 2025-01-06 DIAGNOSIS — E11.9 TYPE 2 DIABETES MELLITUS WITHOUT COMPLICATION, WITHOUT LONG-TERM CURRENT USE OF INSULIN (H): Primary | ICD-10-CM

## 2025-01-06 DIAGNOSIS — Z59.82 LACK OF ACCESS TO TRANSPORTATION: ICD-10-CM

## 2025-01-06 DIAGNOSIS — M99.61 OSSEOUS AND SUBLUXATION STENOSIS OF INTERVERTEBRAL FORAMINA OF CERVICAL REGION: ICD-10-CM

## 2025-01-06 DIAGNOSIS — M54.6 CHRONIC BILATERAL THORACIC BACK PAIN: ICD-10-CM

## 2025-01-06 DIAGNOSIS — G89.29 CHRONIC BILATERAL THORACIC BACK PAIN: ICD-10-CM

## 2025-01-06 LAB
EST. AVERAGE GLUCOSE BLD GHB EST-MCNC: 189 MG/DL
HBA1C MFR BLD: 8.2 % (ref 0–5.6)

## 2025-01-06 SDOH — ECONOMIC STABILITY - TRANSPORTATION SECURITY: TRANSPORTATION INSECURITY: Z59.82

## 2025-01-06 NOTE — PROGRESS NOTES
Assessment & Plan     Chloe is a 4 year old Hmong speaking female here for follow-up on multiple chronic issues as below.    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  Symptoms not improving.  Does have symptoms of C3-C4 nerve involvement consistent with imaging. Patient would likely benefit from repeat imaging and spine referral.   - Physical Therapy  Referral  - Spine  Referral  - MR CERVICAL SPINE W/O CONTRAST    Type 2 diabetes mellitus without complication, without long-term current use of insulin (H)  Last A1c 7.2.  Rechecking today.  BS above goal at home.  - Albumin Random Urine Quantitative with Creat Ratio  - HEMOGLOBIN A1C    Need for prophylactic vaccination and inoculation against influenza  - Flu shot given    Lack of access to transportation  - Primary Care - Care Coordination Referral    Benign essential hypertension  BP at goal in clinic.  Will continue current medications        Subjective   Chloe is a 64 year old, presenting for the following health issues:  Back Pain (On going thing ), Pain in the middle of the breast  (Under the breast on the right side as well), Neck pain  (Not a new thing, been seeing doctor for it already. ), and Imm/Inj (Flu Shot)    Mid back pain  More like a numbness  Has been going on for 6 months  Always there  No injuries  Getting worse  Gets better with tylenol    Neck stiffness turning to right  Left hand numb 4th 5th fingers  Weak in left arm          1/6/2025    11:00 AM   Additional Questions   Roomed by Gabriela   Accompanied by Self         1/6/2025    Information    services provided? Yes   Language [facility]   Type of interpretation provided Face-to-face    name Marisol Beach    Agency [employer]           Objective    /77   Pulse 71    "Temp 98.3  F (36.8  C) (Oral)   Resp 20   Ht 1.455 m (4' 9.28\")   Wt 60.8 kg (134 lb)   SpO2 97%   BMI 28.71 kg/m    Body mass index is 28.71 kg/m .  Physical Exam     GENERAL: healthy, alert and no distress  RESP: speaking in full sentences, normal work of breathing   CV: extremities well perfused  PSYCH: mentation appears normal, affect normal/bright     Cervical spine:  No deformities   Rotation significantly limited to the right  Limited Extension  Limited flexion  Mildly reduced leftward rotation  Spurlings negative    Thoracic spine  No deformites or rashes  Non-tender to palaption  Normal ROM  Rotation reproduces some pain at T7 level              Signed Electronically by: Long Aviles MD    "

## 2025-01-06 NOTE — PROGRESS NOTES
Preceptor Attestation:  Patient's case reviewed and discussed with the resident, Long Aviles MD, and I personally evaluated the patient. I agree with written assessment and plan of care.    Supervising Physician:  Shruthi Soria MD   [facility]

## 2025-01-07 ENCOUNTER — DOCUMENTATION ONLY (OUTPATIENT)
Dept: CARE COORDINATION | Facility: CLINIC | Age: 65
End: 2025-01-07
Payer: COMMERCIAL

## 2025-01-07 ENCOUNTER — APPOINTMENT (OUTPATIENT)
Dept: INTERPRETER SERVICES | Facility: CLINIC | Age: 65
End: 2025-01-07
Payer: COMMERCIAL

## 2025-01-07 LAB
CREAT UR-MCNC: 60.8 MG/DL
MICROALBUMIN UR-MCNC: <12 MG/L
MICROALBUMIN/CREAT UR: NORMAL MG/G{CREAT}

## 2025-01-07 RX ORDER — METFORMIN HYDROCHLORIDE 750 MG/1
750 TABLET, EXTENDED RELEASE ORAL 2 TIMES DAILY WITH MEALS
Qty: 90 TABLET | Refills: 3 | Status: SHIPPED | OUTPATIENT
Start: 2025-01-07

## 2025-01-07 NOTE — CONFIDENTIAL NOTE
Social Work - Transportation  [facility]    Data/Intervention:  Patient Name: Chloe Silva Goes By: Chloe    /Age: 1960 (64 year old)  [facility]  Reason for Referral:  support requested for patient transportation needs for the following appointment.    Alexandro 15, 2025 2:00 PM  (Arrive by 1:45 PM)  Spine Evaluation with Jannet Dallas PT  [facility] [place] [facility] [phone]     2025 2:00 PM  Return Visit with Long Aviles MD  [facility] [phone]          Assessment:  called [facility] to arrange ride through patient's insurance. [facility] arranged  for patient from home with [employer]. Patient will need to call [employer] [phone] when ready for return ride home.  Plan:  available to assist with any other needs.    SMOOTH KAUFMAN, [place], LADC

## 2025-01-15 ENCOUNTER — THERAPY VISIT (OUTPATIENT)
Dept: PHYSICAL THERAPY | Facility: REHABILITATION | Age: 65
End: 2025-01-15
Attending: FAMILY MEDICINE
Payer: COMMERCIAL

## 2025-01-15 DIAGNOSIS — M54.2 NECK PAIN: ICD-10-CM

## 2025-01-15 DIAGNOSIS — G89.29 CHRONIC BILATERAL THORACIC BACK PAIN: ICD-10-CM

## 2025-01-15 DIAGNOSIS — M54.6 CHRONIC BILATERAL THORACIC BACK PAIN: ICD-10-CM

## 2025-01-15 PROCEDURE — 97161 PT EVAL LOW COMPLEX 20 MIN: CPT | Mod: GP | Performed by: PHYSICAL THERAPIST

## 2025-01-15 PROCEDURE — T1013 SIGN LANG/ORAL INTERPRETER: HCPCS | Performed by: PHYSICAL THERAPIST

## 2025-01-15 PROCEDURE — 97110 THERAPEUTIC EXERCISES: CPT | Mod: GP | Performed by: PHYSICAL THERAPIST

## 2025-01-15 NOTE — PROGRESS NOTES
PHYSICAL THERAPY EVALUATION  Type of Visit: Evaluation       Fall Risk Screen:  Fall screen completed by: PT  Have you fallen 2 or more times in the past year?: (Patient-Rptd) No  Have you fallen and had an injury in the past year?: (Patient-Rptd) No  Is patient a fall risk?: No    Subjective         Presenting condition or subjective complaint: midback pain, a numb pain Pt reports upper back pain has been bothering for about 6 months. Pt can have pain at rest and with movement. Pt doesn't remember anything happening when the pain first started to bother. Pt feels like pain has gotten worse since it started - staying in about the same place. Pt reports not having anything that has helped the pain feel better yet except pt can take tylenol if she is having a lot of pain to try to feel a little better.  Pt reports laying on her L side can increase her pain so she mostly will sleep on her back and get a decent night of sleep - sometimes getting up to reposition.  Pt reports pain can get worse with most any direction of movement of her trunk.  Pt reports h/o neck pain L side in 2016 that improved but ever since pt reports she has sat more tilted to R side with body but L side with neck.     Date of onset: 07/15/24    Relevant medical history: Depression; Diabetes; High blood pressure; Menopause; Pain at night or rest; Vision problems h/o L sided neck pain that recovered  Dates & types of surgery:  none    Prior diagnostic imaging/testing results: demo'ed significant   L c3-4 facet arthropathy with foraminal stenosis    Prior therapy history for the same diagnosis, illness or injury: noNo      Prior Level of Function  Transfers: Independent  Ambulation: Independent  ADL: Independent      Living Environment  Social support: Alone   Type of home: Apartment/condo   Stairs to enter the home:         Ramp: No   Stairs inside the home: Yes 5 Is there a railing: Yes     Help at home: None  Equipment owned:       Employment: No     Hobbies/Interests:      Patient goals for therapy: none    Pain assessment:  4-5/10 constant all the time     Objective   CERVICAL SPINE EVALUATION      VITALS :  HR 69-75 bpm  O2 % 95-96%  Blood pressure: 149/81    POSTURE:  Pt prefers to be rotated R through trunk with L torticollis - SB L and rotated R  with neck  GAIT:   Weightbearing Status: WBAT  Assistive Device(s): None  Gait Deviations:  Pt with R trunk rotation with walking and B trendelenberg without increase in mid back pain    ROM:  Neck flexion min limited with pulling into mid back pain, ext major limited without increase in mid back pain, R rotation major limited with pt feeling stiffness without pain, R SB major limited with stiffness, L SB mod limited with stiffness  Thoracic flexion mod limited with increased upper back pain, ext mod limited with some relief of pain, B rotation min limited with slight relief  MYOTOMES:  B UE grossly 4+/5 without increase in back pain    DERMATOMES:  B UE and reported chest wall and upper back WFL with light touch  NEURAL TENSION:  Positive for thoracic tension with deep neck flexion  FLEXIBILITY:  Significant tightness B upper trap, lev scap, scalenes, pecs, cervical paraspinals      SPINAL SEGMENTAL CONCLUSIONS:  Significant cervical hypomobility      Assessment & Plan   CLINICAL IMPRESSIONS  Medical Diagnosis: Chronic bilateral thoracic back pain (M54.6, G89.29)    Neck pain (M54.2)    Treatment Diagnosis: Chronic bilateral thoracic back pain (M54.6, G89.29)    Neck pain (M54.2)   Impression/Assessment: Patient is a 64 year old female with mid back complaints.  Pt reports h/o neck pain 2016 that resolved but she has had significantly limited neck ROM ever since and holds herself in posture with trunk R rotation and cervical L SB. Pt demo's decreased thoracic ROM but some relief of her pain sx with seated stretching. The following significant findings have been identified: Pain, Decreased ROM/flexibility,  Decreased joint mobility, Decreased strength, Impaired muscle performance, Decreased activity tolerance, and Impaired posture. These impairments interfere with their ability to perform self care tasks, recreational activities, household chores, household mobility, and community mobility as compared to previous level of function.     Clinical Decision Making (Complexity):  Clinical Presentation: Stable/Uncomplicated  Clinical Presentation Rationale: based on medical and personal factors listed in PT evaluation  Clinical Decision Making (Complexity): Low complexity    PLAN OF CARE  Treatment Interventions:  Interventions: Manual Therapy, Neuromuscular Re-education, Therapeutic Activity, Therapeutic Exercise, Self-Care/Home Management    Long Term Goals     PT Goal 1  Goal Description: Pt will be able to move and perform ADLs and functional mobility without increase in mid back pain for improved QOL in 90 days.  Target Date: 04/18/25      Frequency of Treatment: 1x/week  Duration of Treatment: 90 days    Risks and benefits of evaluation/treatment have been explained.   Patient/Family/caregiver agrees with Plan of Care.     Evaluation Time:     PT Eval, Low Complexity Minutes (68891): 30  Signing Clinician: Jannet Dallas, PT, DPT, CLT        [facility]                                                                                   OUTPATIENT PHYSICAL THERAPY      PLAN OF TREATMENT FOR OUTPATIENT REHABILITATION   Patient's Last Name, First Name, SHELBYYanickMENGYanick  SilvaChloe    YOB: 1960   Provider's Name   [facility]   Medical Record No.  [id number]     Onset Date: 07/15/24  Start of Care Date: 01/15/25     Medical Diagnosis:  Chronic bilateral thoracic back pain (M54.6, G89.29)    Neck pain (M54.2)      PT Treatment Diagnosis:  Chronic bilateral thoracic back pain (M54.6, G89.29)    Neck pain (M54.2) Plan of Treatment  Frequency/Duration: 1x/week/ 90  days    Certification date from 01/15/25 to 04/14/25         See note for plan of treatment details and functional goals     Jannet Dallas, PT, DPT, CLT                         I CERTIFY THE NEED FOR THESE SERVICES FURNISHED UNDER        THIS PLAN OF TREATMENT AND WHILE UNDER MY CARE     (Physician attestation of this document indicates review and certification of the therapy plan).              Referring Provider:  Shruthi Soria MD    Initial Assessment  See Epic Evaluation- Start of Care Date: 01/15/25

## 2025-01-20 ENCOUNTER — DOCUMENTATION ONLY (OUTPATIENT)
Dept: OTHER | Facility: CLINIC | Age: 65
End: 2025-01-20
Payer: COMMERCIAL

## 2025-02-06 ENCOUNTER — APPOINTMENT (OUTPATIENT)
Dept: INTERPRETER SERVICES | Facility: CLINIC | Age: 65
End: 2025-02-06
Payer: COMMERCIAL

## 2025-02-10 ENCOUNTER — OFFICE VISIT (OUTPATIENT)
Dept: FAMILY MEDICINE | Facility: CLINIC | Age: 65
End: 2025-02-10
Payer: COMMERCIAL

## 2025-02-10 VITALS
TEMPERATURE: 98.3 F | HEIGHT: 57 IN | WEIGHT: 134 LBS | BODY MASS INDEX: 28.91 KG/M2 | OXYGEN SATURATION: 96 % | RESPIRATION RATE: 18 BRPM | HEART RATE: 88 BPM | SYSTOLIC BLOOD PRESSURE: 135 MMHG | DIASTOLIC BLOOD PRESSURE: 76 MMHG

## 2025-02-10 DIAGNOSIS — E11.9 TYPE 2 DIABETES MELLITUS WITHOUT COMPLICATION, WITHOUT LONG-TERM CURRENT USE OF INSULIN (H): ICD-10-CM

## 2025-02-10 DIAGNOSIS — E78.00 PURE HYPERCHOLESTEROLEMIA: ICD-10-CM

## 2025-02-10 DIAGNOSIS — Z78.0 ASYMPTOMATIC POSTMENOPAUSAL STATUS: Primary | ICD-10-CM

## 2025-02-10 DIAGNOSIS — H53.8 BLURRED VISION: ICD-10-CM

## 2025-02-10 DIAGNOSIS — Z12.31 VISIT FOR SCREENING MAMMOGRAM: ICD-10-CM

## 2025-02-10 DIAGNOSIS — Z12.11 SCREEN FOR COLON CANCER: ICD-10-CM

## 2025-02-10 LAB
CHOLEST SERPL-MCNC: 247 MG/DL
FASTING STATUS PATIENT QL REPORTED: NO
HDLC SERPL-MCNC: 45 MG/DL
LDLC SERPL CALC-MCNC: 143 MG/DL
NONHDLC SERPL-MCNC: 202 MG/DL
TRIGL SERPL-MCNC: 294 MG/DL

## 2025-02-10 PROCEDURE — 99214 OFFICE O/P EST MOD 30 MIN: CPT | Mod: GC

## 2025-02-10 PROCEDURE — 80061 LIPID PANEL: CPT

## 2025-02-10 PROCEDURE — 36415 COLL VENOUS BLD VENIPUNCTURE: CPT

## 2025-02-10 RX ORDER — ATORVASTATIN CALCIUM 40 MG/1
40 TABLET, FILM COATED ORAL DAILY
Qty: 90 TABLET | Refills: 3 | Status: SHIPPED | OUTPATIENT
Start: 2025-02-10

## 2025-02-10 RX ORDER — METFORMIN HYDROCHLORIDE 750 MG/1
750 TABLET, EXTENDED RELEASE ORAL 2 TIMES DAILY WITH MEALS
Qty: 90 TABLET | Refills: 3 | Status: SHIPPED | OUTPATIENT
Start: 2025-02-10

## 2025-02-10 NOTE — PROGRESS NOTES
"  Assessment & Plan     Blurred vision  Six months of foggy vision in left eye. Blury like looking through a blury window like fog. Say an eye doctor \"a while ago.\" unable to make follow up due to transport issues. Could be cataract, diabetic retinopathy, or less likely due to chronic nature,amaurosis fugax. Will need to see an ophthalmologist.   - Adult Eye  Referral; Future    Type 2 diabetes mellitus without complication, without long-term current use of insulin (H)  Last A1c 8.2. Has been unable to take new metformin dose due to un-translated label. Will follow up in 1 month.  - metFORMIN (GLUCOPHAGE-XR) 750 MG 24 hr tablet; Take 1 tablet (750 mg) by mouth 2 times daily (with meals).  - Follow up in 1 month    Pure hypercholesterolemia  Previously prescribed 3 anti-triglyceride medications, atorvastatin, fenofibrate, and ezetimibe. Not taking any of them. Will try just atorvastatin and check fasting triglycerides.  - atorvastatin (LIPITOR) 40 MG tablet; Take 1 tablet (40 mg) by mouth daily.  - Fasting Lipid Profile    BMI  Estimated body mass index is 28.52 kg/m  as calculated from the following:    Height as of this encounter: 1.46 m (4' 9.48\").    Weight as of this encounter: 60.8 kg (134 lb).   Weight management plan: None      MEDICATIONS:   Orders Placed This Encounter   Medications    metFORMIN (GLUCOPHAGE-XR) 750 MG 24 hr tablet     Sig: Take 1 tablet (750 mg) by mouth 2 times daily (with meals).     Dispense:  90 tablet     Refill:  3     Please print script in Hmong    atorvastatin (LIPITOR) 40 MG tablet     Sig: Take 1 tablet (40 mg) by mouth daily.     Dispense:  90 tablet     Refill:  3          - Continue other medications without change    No follow-ups on file.    Subjective   Xeromeo is a 64 year old female with history of T2DM, hypertriglyceridemia, and chronic back pain presenting for diabetes follow up.    No written Hmong label on metformin. We will fix that. Ask pharmacy to " "translate.    Back pain has improved some with physcial therapy. Has home exercises.      Six months of foggy vision in left eye. Blury like looking through a blury window like fog. Say an eye doctor \"a while ago.\" unable to make follow up due to transport issues.    Review of Systems  Constitutional, HEENT, cardiovascular, pulmonary, gi and gu systems are negative, except as otherwise noted.      Objective    There were no vitals taken for this visit.  There is no height or weight on file to calculate BMI.  Physical Exam   GENERAL: alert and no distress  EYES: Eyes grossly normal to inspection, PERRL and conjunctivae and sclerae normal  RESP: lungs clear to auscultation - no rales, rhonchi or wheezes  MS: no gross musculoskeletal defects noted, no edema  SKIN: no suspicious lesions or rashes  PSYCH: mentation appears normal, affect normal/bright  Diabetic foot exam: normal DP and PT pulses, no trophic changes or ulcerative lesions, and normal sensory exam    Office Visit on 01/06/2025   Component Date Value Ref Range Status    Creatinine Urine mg/dL 01/06/2025 60.8  mg/dL Final    The reference ranges have not been established in urine creatinine. The results should be integrated into the clinical context for interpretation.    Albumin Urine mg/L 01/06/2025 <12.0  mg/L Final    The reference ranges have not been established in urine albumin. The results should be integrated into the clinical context for interpretation.    Albumin Urine mg/g Cr 01/06/2025    Final    Unable to calculate, urine albumin and/or urine creatinine is outside detectable limits.  Microalbuminuria is defined as an albumin:creatinine ratio of 17 to 299 for males and 25 to 299 for females. A ratio of albumin:creatinine of 300 or higher is indicative of overt proteinuria.  Due to biologic variability, positive results should be confirmed by a second, first-morning random or 24-hour timed urine specimen. If there is discrepancy, a third specimen " is recommended. When 2 out of 3 results are in the microalbuminuria range, this is evidence for incipient nephropathy and warrants increased efforts at glucose control, blood pressure control, and institution of therapy with an angiotensin-converting-enzyme (ACE) inhibitor (if the patient can tolerate it).      Estimated Average Glucose 01/06/2025 189 (H)  <117 mg/dL Final    Hemoglobin A1C 01/06/2025 8.2 (H)  0.0 - 5.6 % Final    Normal <5.7%   Prediabetes 5.7-6.4%    Diabetes 6.5% or higher     Note: Adopted from ADA consensus guidelines.         Kelli Suarez, MS3  [facility]    I was present with the medical student who participated in the service and in the documentation of this note. I have verified the history and personally performed the physical exam and medical decision making, and have verified the content of the note, which accurately reflects my assessment of the patient and the plan of care.      Signed Electronically by: Long Aviles MD

## 2025-02-10 NOTE — PROGRESS NOTES
Preceptor Attestation:   Patient seen, evaluated and discussed with the resident. I have verified the content of the note, which accurately reflects my assessment of the patient and the plan of care.   Supervising Physician:  Milton Bass MD

## 2025-02-10 NOTE — NURSING NOTE
Prior to immunization administration, verified patients identity using patient s name and date of birth. Please see Immunization Activity for additional information.     Screening Questionnaire for Adult Immunization    Are you sick today?   No   Do you have allergies to medications, food, a vaccine component or latex?   No   Have you ever had a serious reaction after receiving a vaccination?   No   Do you have a long-term health problem with heart, lung, kidney, or metabolic disease (e.g., diabetes), asthma, a blood disorder, no spleen, complement component deficiency, a cochlear implant, or a spinal fluid leak?  Are you on long-term aspirin therapy?   No   Do you have cancer, leukemia, HIV/AIDS, or any other immune system problem?   No   Do you have a parent, brother, or sister with an immune system problem?   No   In the past 3 months, have you taken medications that affect  your immune system, such as prednisone, other steroids, or anticancer drugs; drugs for the treatment of rheumatoid arthritis, Crohn s disease, or psoriasis; or have you had radiation treatments?   No   Have you had a seizure, or a brain or other nervous system problem?   No   During the past year, have you received a transfusion of blood or blood    products, or been given immune (gamma) globulin or antiviral drug?   No   For women: Are you pregnant or is there a chance you could become       pregnant during the next month?   No   Have you received any vaccinations in the past 4 weeks?   No     Immunization questionnaire answers were all negative.      Patient instructed to remain in clinic for 15 minutes afterwards, and to report any adverse reactions.     Screening performed by Gabriela Soliz CMA on 2/10/2025 at 3:00 PM.

## 2025-04-09 ENCOUNTER — OFFICE VISIT (OUTPATIENT)
Dept: FAMILY MEDICINE | Facility: CLINIC | Age: 65
End: 2025-04-09
Payer: COMMERCIAL

## 2025-04-09 VITALS
OXYGEN SATURATION: 99 % | DIASTOLIC BLOOD PRESSURE: 87 MMHG | BODY MASS INDEX: 26.61 KG/M2 | WEIGHT: 132 LBS | HEART RATE: 68 BPM | HEIGHT: 59 IN | SYSTOLIC BLOOD PRESSURE: 166 MMHG

## 2025-04-09 DIAGNOSIS — E11.9 TYPE 2 DIABETES MELLITUS WITHOUT COMPLICATION, WITHOUT LONG-TERM CURRENT USE OF INSULIN (H): ICD-10-CM

## 2025-04-09 DIAGNOSIS — Z78.0 ASYMPTOMATIC POSTMENOPAUSAL STATUS: ICD-10-CM

## 2025-04-09 DIAGNOSIS — Z12.11 SCREEN FOR COLON CANCER: ICD-10-CM

## 2025-04-09 DIAGNOSIS — I10 BENIGN ESSENTIAL HYPERTENSION: ICD-10-CM

## 2025-04-09 DIAGNOSIS — Z12.31 VISIT FOR SCREENING MAMMOGRAM: ICD-10-CM

## 2025-04-09 DIAGNOSIS — Z00.00 ROUTINE GENERAL MEDICAL EXAMINATION AT A HEALTH CARE FACILITY: Primary | ICD-10-CM

## 2025-04-09 DIAGNOSIS — Z00.00 WELLNESS EXAMINATION: Primary | ICD-10-CM

## 2025-04-09 DIAGNOSIS — K21.9 GASTROESOPHAGEAL REFLUX DISEASE WITHOUT ESOPHAGITIS: ICD-10-CM

## 2025-04-09 LAB
EST. AVERAGE GLUCOSE BLD GHB EST-MCNC: 177 MG/DL
HBA1C MFR BLD: 7.8 % (ref 0–5.6)

## 2025-04-09 RX ORDER — FAMOTIDINE 20 MG/1
20 TABLET, FILM COATED ORAL 2 TIMES DAILY PRN
Qty: 90 TABLET | Refills: 3 | Status: SHIPPED | OUTPATIENT
Start: 2025-04-09

## 2025-04-09 SDOH — HEALTH STABILITY: PHYSICAL HEALTH: ON AVERAGE, HOW MANY MINUTES DO YOU ENGAGE IN EXERCISE AT THIS LEVEL?: 50 MIN

## 2025-04-09 SDOH — HEALTH STABILITY: PHYSICAL HEALTH: ON AVERAGE, HOW MANY DAYS PER WEEK DO YOU ENGAGE IN MODERATE TO STRENUOUS EXERCISE (LIKE A BRISK WALK)?: 2 DAYS

## 2025-04-09 ASSESSMENT — SOCIAL DETERMINANTS OF HEALTH (SDOH)
HOW OFTEN DO YOU GET TOGETHER WITH FRIENDS OR RELATIVES?: ONCE A WEEK
HOW OFTEN DO YOU GET TOGETHER WITH FRIENDS OR RELATIVES?: ONCE A WEEK

## 2025-04-09 NOTE — PATIENT INSTRUCTIONS
"Patient Education   Bessie Jovon Xeeb Saib Xyuas Kom Tiv Thaiv Tus Kheej  Nov yog ib co bessie jovon xeeb uas peb yeej meem muab brittany tib neeg pab lawv noj qab nyob zoo. Tara zaum koj pab pawg saib xyuas yuav muaj ib co bessie jovon xeeb uas tshwj xeeb brittany koj nkaus xwb. Thov nrog koj pab pawg saib xyuas sib jason txog tara yam uas koj toob suzanne kom tiv thaiv koj tus kheej.  Fernandez Coj Lub Neej  Es xaws xais ntau mikal 150 feeb txhua lub walls tiam (30 feeb txhua hnub, 5 hnub ib lub walls tiam).  Ua yam pab cov leeg muaj zog tuaj 2 zaug txhua lub walls tiam. Tara yam no pab koj tswj hwm koj lub cev qhov hnyav thiab tiv thaiv ntawm kab mob.  Txhob haus luam yeeb.  Pleev tshuaj tiv thaiv tshav kub kom thiaj tsis raug mob khees xaws ntawm nqaij tawv.  Txhua 2 mus brittany 5 xyoos yuav tau kuaj koj lub tsev brittany qhov radon. Radon yog ib sarah aylin uas tsis muaj xim tsis muaj ntxhiab uas mob tau koj cov ntsws. Kom thiaj kawm ntxiv, mus saib [email or web address] thiab ntaus ntawv \"Radon in Homes.\"  Ceev cov phom kom tsis muaj mos txwv brittany hauv thiab muab xauv kera hauv ib qho chaw nyab xeeb xws li lub txhoj, los yog muab xauv kera thiab muab cov yawm sij zais kera. Muab cov mos txwv xauv brittany hauv lwm qhov chaw nrug cov phom. Kom thiaj kawm ntxiv, mus saib [email or web address] thiab ntaus ntawv \"safe gun storage.\"  Fernandez Noj Qab Huv  Noj 5 qho txiv hmab txiv ntoo thiab zaub txhua hnub.  Noj nplem nplej, txhuv xim av thiab cov fawm muaj nplej (los theej nplem dawb, txhuv dawb, thiab fawm dawb).  Ua tib zoo noj calcium thiab vitamin D ntau. Saib cov ntawv lo brittany pob khoom noj thiab siv zog noj kom txog 100% RDA (qhov ntau hauv ib hnub).  Yeej meem kuaj ntsuas  Txhua 6 lub hli mus kuaj hniav thiab muab tu.  Txhua xyoo mus saib koj pab pawg saib xyuas fernandez noj qab nyob zoo kom sib jason txog:  Ib yam dab tsi uas hloov ntawm koj txoj fernandez noj qab nyob zoo.  Cov tshuaj uas koj pab pawg tau hais kom siv.  Fernandez saib xyuas kom tiv thaiv, fernandez npaj brittany tsev neeg, thiab yuav ua li vincenzo tiv " thaiv ntawm kab mob uas ntev.  Fernandez txhaj tshuaj (koob tshuaj tiv thaiv)   Cov koob tshuaj HPV (txog thaum muaj 24 xyoo), yog koj yeej tsis tau txais mikal li.  Cov koob tshuaj Hepatitis B Kab Mob Siab (txog thaum muaj 57 xyoos), yog koj yeej tsis tau txais mikal li.  Koob tshuaj COVID-19: Txais koob tshuaj no thaum txog caij txais.  Koob tshuaj tiv thaiv ntawm mob khaub thuas: Txais txhua xyoo.  Koob tshuaj tiv thaiv mob daig tsaig: Txais txhua 10 xyoo.  Pneumococcal, hepatitis A, thiab RSV cov koob tshuaj: Nug koj pab pawg saib xyuas theresa puas tsim nyog brittany koj txais cov no raws li koj qhov pheej hmoo.  Koob tshuaj tiv thaiv ntawm kab mob sawv hlwv (brittany cov muaj 50 xyoo rov mike).  Fernandez kuaj ntsuas brittany fernandez noj qab nyob zoo  Kuaj mob ntshav qab zib:  Pib thaum muaj 33 xyoos, Mus kuaj mob ntshav qab zib txhua 3 xyoos los yog ntau zog.  Yog koj tseem tsis tau txog 33 xyoos, nug koj pab pawg saib xyuas theresa puas tsim nyog brittany koj kuaj mob ntshav qab zib.  Kuaj cholesterol: Thaum muaj 37 xyoo, pib kuaj cholesterol txhua 5 xyoos, los yog ntau mikal ntawd yog kws sarmad mob qhia.  Kuaj txha qhov tuab (DEXA): Thaum txog 48 xyoo lawd, nug koj pab pawg saib xyuas theresa puas tsim nyog brittany koj kuaj txha theresa puas ruaj.  Kab Mob Siab Hepatitis C: Kuaj ib zaug hauv koj lub neej.  Kuaj Txoj Hlab Ntshav Hauv Plab: Nrog koj tus kws sarmad mob jason txog fernandez kuaj ntsuas no yog koj:  Tau haus luam yeeb ib zaug li; thiab  Yog txiv neej; thiab  Nruab hnub nyoog 65 thiab 75.  Mob Suzanne Cees (kis mob dhau ntawm fernandez sib deev)  Ua ntej muaj 24 xyoos: Nug koj pab pawg saib xyuas theresa puas tsim nyog kuaj brittany mob suzanne cees.  Ricky qab muaj 24 xyoos: Mus kuaj brittany mob suzanne cees yog koj muaj fernandez pheej hmoo. Koj muaj fernandez pheej hmoo brittany mob suzanne cees (suav nrog HIV) yog:  Koj sib deev nrog ntau mikal ib tug neeg.  Koj tsis siv cov hnab looj qau thaum sib deev.  Koj los yog koj tus khub deev kuaj pom tias muaj mob suzanne cees lawm.  Yog koj muaj fernandez pheej hmoo brittany mob suzanne cees  HIV, nug txog cov tshuaj PrEP kom tiv thaiv ntawm HIV.  Mus kuaj brittany mob suzanne cees HIV yam ntau mikal ib zaug hauv koj lub neej, txawm yog koj muaj fernandez pheej hmoo brittany mob suzanne cees HIV los tsis muaj los xij.  Kuaj brittany mob khees xaws  Kuaj lub ncauj tsev me nyuam brittany mob khees xaws: Yog koj muaj ib lub ncauj tsev me nyuam, sagar yuav tau ib sij kuaj lub ncauj tsev me nyuam seb puas muaj mob khees xaws pib thaum muaj 19 xyoos. Neeg feem coob uas ib sij kuaj lub ncauj tsev me nyuam thiab tsis pom dab tsi txawv lawv tsum tau damon qab muaj 63 xyoos. Nrog koj tus kws sarmad mob sib jason txog qhov no.  Kuaj lub mis brittany mob khees xaws (mammogram): Yog koj tau muaj mis mikal li, yuav tau ib sij kuaj lub mis pib thaum muaj 40 xyoo. Fernandez kuaj no yog kom saib seb puas muaj mob khees xaws hauv lub mis.  Kuaj Txoj Hnyuv Brittany Mob Khees Xaws: Yeej tseem ceeb pib kuaj txoj hnyuv brittany mob khees xaws pib thaum muaj 43 xyoos.  Txhua 10 xyoo yuav tau kuaj txoj hnyuv (los yog ntau zog yog koj muaj fernandez pheej hmoo) Los sis, nug koj tus kws sarmad mob txog fernandez kuaj thooj quav FIT txhua xyoo los yog Cologuard txhua 1 xyoos.  Kom thiaj kawm ntxiv txog tara sarah kuaj no, mus saib: [email or web address].  Yog xav tau fernandez pab txog qhov no, mus saib: [email or web address].  Kuaj lub dominik kua phev (prostate) brittany mob khees xaws: Yog koj muaj ib lub dominik kua phev (prostate) thiab nruab hnub nyoog 53 mus brittany 69, nug koj tus kws sarmad mob theresa puas tsim nyog brittany koj kuaj lub dominik kua phev.  Kuaj ntsws brittany mob khees xaws: Yog koj haus luam yeeb dennison sim no los yog tau haus yav tas los uas muaj hnub nyoog nruab 50 xyoos mus brittany 80 xyoo, nug koj pab pawg saib xyuas theresa puas tsim nyog brittany koj yeej meem kuaj lub ntsws brittany mob khees xaws.    Brittany cov sarah phiaj fernandez siv ua ntaub ntawv qhia nkaus xwb. Yuav tsis pauv fernandez qhia los ntawm koj qhov chaw sarmad mob. Copyright (marcelo butterfield)   2023 [facility].   Txhua txoj scout raug tswj tseg lawm. Tshaj xyuas los ntawm [facility]. [employer] [id number] - REV 04/24.  Learning About Activities of Daily Living  What are activities of daily living?     Activities of daily living (ADLs) are the basic self-care tasks you do every day. These include eating, bathing, dressing, and moving around.  As you age, and if you have health problems, you may find that it's harder to do some of these tasks. If so, your doctor can suggest ideas that may help.  To measure what kind of help you may need, your doctor will ask how well you are able to do ADLs. Let your doctor know if there are any tasks that you are having trouble doing. This is an important first step to getting help. And when you have the help you need, you can stay as independent as possible.  How will a doctor assess your ADLs?  Asking about ADLs is part of a routine health checkup your doctor will likely do as you age. Your health check might be done in a doctor's office, in your home, or at a hospital. The goal is to find out if you are having any problems that could make it hard to care for yourself or that make it unsafe for you to be on your own.  To measure your ADLs, your doctor will ask how hard it is for you to do routine tasks. Your doctor may also want to know if you have changed the way you do a task because of a health problem. Your doctor may watch how you:  Walk back and forth.  Keep your balance while you stand or walk.  Move from sitting to standing or from a bed to a chair.  Button or unbutton a shirt or sweater.  Remove and put on your shoes.  It's common to feel a little worried or anxious if you find you can't do all the things you used to be able to do. Talking with your doctor about ADLs is a way to make sure you're as safe as possible and able to care for yourself as well as you can. You may want to bring a caregiver, friend, or family member to your checkup. They can help you talk to your doctor.  Follow-up care is a key part of your treatment  and safety. Be sure to make and go to all appointments, and call your doctor if you are having problems. It's also a good idea to know your test results and keep a list of the medicines you take.  Current as of: October 24, 2024  Content Version: 14.4    5662-0791 [employer].   Care instructions adapted under license by your healthcare professional. If you have questions about a medical condition or this instruction, always ask your healthcare professional. [employer] disclaims any warranty or liability for your use of this information.    Learning About Stress  What is stress?     Stress is your body's response to a hard situation. Your body can have a physical, emotional, or mental response. Stress is a fact of life for most people, and it affects everyone differently. What causes stress for you may not be stressful for someone else.  A lot of things can cause stress. You may feel stress when you go on a job interview, take a test, or run a race. This kind of short-term stress is normal and even useful. It can help you if you need to work hard or react quickly. For example, stress can help you finish an important job on time.  Long-term stress is caused by ongoing stressful situations or events. Examples of long-term stress include long-term health problems, ongoing problems at work, or conflicts in your family. Long-term stress can harm your health.  How does stress affect your health?  When you are stressed, your body responds as though you are in danger. It makes hormones that speed up your heart, make you breathe faster, and give you a burst of energy. This is called the fight-or-flight stress response. If the stress is over quickly, your body goes back to normal and no harm is done.  But if stress happens too often or lasts too long, it can have bad effects. Long-term stress can make you more likely to get sick, and it can make symptoms of some diseases worse. If you tense up when you  are stressed, you may develop neck, shoulder, or low back pain. Stress is linked to high blood pressure and heart disease.  Stress also harms your emotional health. It can make you villalobos, tense, or depressed. Your relationships may suffer, and you may not do well at work or school.  What can you do to manage stress?  You can try these things to help manage stress:   Do something active. Exercise or activity can help reduce stress. Walking is a great way to get started. Even everyday activities such as housecleaning or yard work can help.  Try yoga or gómez chi. These techniques combine exercise and meditation. You may need some training at first to learn them.  Do something you enjoy. For example, listen to music or go to a movie. Practice your hobby or do volunteer work.  Meditate. This can help you relax, because you are not worrying about what happened before or what may happen in the future.  Do guided imagery. Imagine yourself in any setting that helps you feel calm. You can use online videos, books, or a teacher to guide you.  Do breathing exercises. For example:  From a standing position, bend forward from the waist with your knees slightly bent. Let your arms dangle close to the floor.  Breathe in slowly and deeply as you return to a standing position. Roll up slowly and lift your head last.  Hold your breath for just a few seconds in the standing position.  Breathe out slowly and bend forward from the waist.  Let your feelings out. Talk, laugh, cry, and express anger when you need to. Talking with supportive friends or family, a counselor, or a forest leader about your feelings is a healthy way to relieve stress. Avoid discussing your feelings with people who make you feel worse.  Write. It may help to write about things that are bothering you. This helps you find out how much stress you feel and what is causing it. When you know this, you can find better ways to cope.  What can you do to prevent stress?  You  "might try some of these things to help prevent stress:  Manage your time. This helps you find time to do the things you want and need to do.  Get enough sleep. Your body recovers from the stresses of the day while you are sleeping.  Get support. Your family, friends, and community can make a difference in how you experience stress.  Limit your news feed. Avoid or limit time on social media or news that may make you feel stressed.  Do something active. Exercise or activity can help reduce stress. Walking is a great way to get started.  Where can you learn more?  Go to https://www.[employer].net/patiented  Enter N032 in the search box to learn more about \"Learning About Stress.\"  Current as of: October 24, 2024  Content Version: 14.4 2024-2025 [employer].   Care instructions adapted under license by your healthcare professional. If you have questions about a medical condition or this instruction, always ask your healthcare professional. [employer] disclaims any warranty or liability for your use of this information.    Eating Healthy Foods: Care Instructions  With every meal, you can make healthy food choices. Try to eat a variety of fruits, vegetables, whole grains, lean proteins, and low-fat dairy products. This can help you get the right balance of nutrients, including vitamins and minerals. Small changes add up over time. You can start by adding one healthy food to your meals each day.    Try to make half your plate fruits and vegetables, one-fourth whole grains, and one-fourth lean proteins. Try including dairy with your meals.   Eat more fruits and vegetables. Try to have them with most meals and snacks.   Foods for healthy eating        Fruits   These can be fresh, frozen, canned, or dried.  Try to choose whole fruit rather than fruit juice.  Eat a variety of colors.        Vegetables   These can be fresh, frozen, canned, or dried.  Beans, peas, and lentils count too.        Whole " "grains   Choose whole-grain breads, cereals, and noodles.  Try brown rice.        Lean proteins   These can include lean meat, poultry, fish, and eggs.  You can also have tofu, beans, peas, lentils, nuts, and seeds.        Dairy   Try milk, yogurt, and cheese.  Choose low-fat or fat-free when you can.  If you need to, use lactose-free milk or fortified plant-based milk products, such as soy milk.        Water   Drink water when you're thirsty.  Limit sugar-sweetened drinks, including soda, fruit drinks, and sports drinks.  Where can you learn more?  Go to https://www.[employer].net/patiented  Enter T756 in the search box to learn more about \"Eating Healthy Foods: Care Instructions.\"  Current as of: October 7, 2024  Content Version: 14.4    [id number] [employer].   Care instructions adapted under license by your healthcare professional. If you have questions about a medical condition or this instruction, always ask your healthcare professional. [employer] disclaims any warranty or liability for your use of this information.    Preventive Care Advice   This is general advice given by our system to help you stay healthy. However, your care team may have specific advice just for you. Please talk to your care team about your preventive care needs.  Nutrition  Eat 5 or more servings of fruits and vegetables each day.  Try wheat bread, brown rice and whole grain pasta (instead of white bread, rice, and pasta).  Get enough calcium and vitamin D. Check the label on foods and aim for 100% of the RDA (recommended daily allowance).  Lifestyle  Exercise at least 150 minutes each week  (30 minutes a day, 5 days a week).  Do muscle strengthening activities 2 days a week. These help control your weight and prevent disease.  No smoking.  Wear sunscreen to prevent skin cancer.  Have a dental exam and cleaning every 6 months.  Yearly exams  See your health care team every year to talk about:  Any changes in your " health.  Any medicines your care team has prescribed.  Preventive care, family planning, and ways to prevent chronic diseases.  Shots (vaccines)   HPV shots (up to age 26), if you've never had them before.  Hepatitis B shots (up to age 59), if you've never had them before.  COVID-19 shot: Get this shot when it's due.  Flu shot: Get a flu shot every year.  Tetanus shot: Get a tetanus shot every 10 years.  Pneumococcal, hepatitis A, and RSV shots: Ask your care team if you need these based on your risk.  Shingles shot (for age 50 and up)  General health tests  Diabetes screening:  Starting at age 35, Get screened for diabetes at least every 3 years.  If you are younger than age 35, ask your care team if you should be screened for diabetes.  Cholesterol test: At age 39, start having a cholesterol test every 5 years, or more often if advised.  Bone density scan (DEXA): At age 50, ask your care team if you should have this scan for osteoporosis (brittle bones).  Hepatitis C: Get tested at least once in your life.  STIs (sexually transmitted infections)  Before age 24: Ask your care team if you should be screened for STIs.  After age 24: Get screened for STIs if you're at risk. You are at risk for STIs (including HIV) if:  You are sexually active with more than one person.  You don't use condoms every time.  You or a partner was diagnosed with a sexually transmitted infection.  If you are at risk for HIV, ask about PrEP medicine to prevent HIV.  Get tested for HIV at least once in your life, whether you are at risk for HIV or not.  Cancer screening tests  Cervical cancer screening: If you have a cervix, begin getting regular cervical cancer screening tests starting at age 21.  Breast cancer scan (mammogram): If you've ever had breasts, begin having regular mammograms starting at age 40. This is a scan to check for breast cancer.  Colon cancer screening: It is important to start screening for colon cancer at age 45.  Have  "a colonoscopy test every 10 years (or more often if you're at risk) Or, ask your provider about stool tests like a FIT test every year or Cologuard test every 3 years.  To learn more about your testing options, visit:   .  For help making a decision, visit:   https://bit.mk/ny73124.  Prostate cancer screening test: If you have a prostate, ask your care team if a prostate cancer screening test (PSA) at age 55 is right for you.  Lung cancer screening: If you are a current or former smoker ages 50 to 80, ask your care team if ongoing lung cancer screenings are right for you.  For informational purposes only. Not to replace the advice of your health care provider. Copyright   2023 [facility]. All rights reserved. Clinically reviewed by the [facility] Transitions Program. Megadyne [id number] - REV 01/24.  Bessie Jovon Xeeb Saib Xyuas Kom Tiv Thaiv Tus Kheej  Nov yog ib co bessie jovon xeeb uas peb yeej meem muab brittany tib neeg pab lawv noj qab nyob zoo. Tara zaum koj pab pawg saib xyuas yuav muaj ib co bessie jovon xeeb uas tshwj xeeb brittany koj nkaus xwb. Thov nrog koj pab pawg saib xyuas sib jason txog tara yam uas koj toob suzanne kom tiv thaiv koj tus kheej.  Palomo Coj Lub Neej  Es xaws xais ntau mikal 150 feeb txhua lub walls tiam (30 feeb txhua hnub, 5 hnub ib lub walls tiam).  Ua yam pab cov leeg muaj zog tuaj 2 zaug txhua lub walls tiam. Tara yam no pab koj tswj hwm koj lub cev qhov hnyav thiab tiv thaiv ntawm kab mob.  Txhob haus luam yeeb.  Pleev tshuaj tiv thaiv tshav kub kom thiaj tsis raug mob khees xaws ntawm nqaij tawv.  Txhua 2 mus brittany 5 xyoos yuav tau kuaj koj lub tsev brittany qhov radon. Radon yog ib sarah aylin uas tsis muaj xim tsis muaj ntxhiab uas mob tau koj cov ntsws. Kom thiaj kawm ntxiv, mus saib [email or web address] thiab ntaus ntawv \"Radon in Homes.\"  Ceev cov phom kom tsis muaj mos txwv brittany hauv thiab muab xauv kera hauv ib qho chaw nyab xeeb xws li lub txhoj, los yog muab xauv kera thiab muab cov yawm sij zais kera. " "Muab cov mos txwv xauv brittany hauv lwm qhov chaw nrug cov phom. Kom thiaj kawm ntxiv, mus saib [email or web address] thiab ntaus ntawv \"safe gun storage.\"  Fernandez Noj Qab Huv  Noj 5 qho txiv hmab txiv ntoo thiab zaub txhua hnub.  Noj nplem nplej, txhuv xim av thiab cov fawm muaj nplej (los theej nplem dawb, txhuv dawb, thiab fawm dawb).  Ua tib zoo noj calcium thiab vitamin D ntau. Saib cov ntawv lo brittany pob khoom noj thiab siv zog noj kom txog 100% RDA (qhov ntau hauv ib hnub).  Yeej meem kuaj ntsuas  Txhua 6 lub hli mus kuaj hniav thiab muab tu.  Txhua xyoo mus saib koj pab pawg saib xyuas fernandez noj qab nyob zoo kom sib jason txog:  Ib yam dab tsi uas hloov ntawm koj txoj fernandez noj qab nyob zoo.  Cov tshuaj uas koj pab pawg tau hais kom siv.  Fernandez saib xyuas kom tiv thaiv, fernandez npaj brittany tsev neeg, thiab yuav ua li vincenzo tiv thaiv ntawm kab mob uas ntev.  Fernandez txhaj tshuaj (koob tshuaj tiv thaiv)   Cov koob tshuaj HPV (txog thaum muaj 26 xyoo), yog koj yeej tsis tau txais mikal li.  Cov koob tshuaj Hepatitis B Kab Mob Siab (txog thaum muaj 59 xyoos), yog koj yeej tsis tau txais mikal li.  Koob tshuaj COVID-19: Txais koob tshuaj no thaum txog caij txais.  Koob tshuaj tiv thaiv ntawm mob khaub thuas: Txais txhua xyoo.  Koob tshuaj tiv thaiv mob daig tsaig: Txais txhua 10 xyoo.  Pneumococcal, hepatitis A, thiab RSV cov koob tshuaj: Nug koj pab pawg saib xyuas theresa puas tsim nyog brittany koj txais cov no raws li koj qhov pheej hmoo.  Koob tshuaj tiv thaiv ntawm kab mob sawv hlwv (brittany cov muaj 50 xyoo rov mike).  Fernandez kuaj ntsuas brittany fernandez noj qab nyob zoo  Kuaj mob ntshav qab zib:  Pib thaum muaj 33 xyoos, Mus kuaj mob ntshav qab zib txhua 3 xyoos los yog ntau zog.  Yog koj tseem tsis tau txog 33 xyoos, nug koj pab pawg saib xyuas theresa puas tsim nyog brittany koj kuaj mob ntshav qab zib.  Kuaj cholesterol: Thaum muaj 37 xyoo, pib kuaj cholesterol txhua 5 xyoos, los yog ntau mikal ntawd yog kws sarmad mob qhia.  Kuaj txha qhov tuab (DEXA): Thaum txog 48 xyoo lawd, nug " koj pab pawg saib xyuas theresa puas tsim nyog brittany koj kuaj txha theresa puas ruaj.  Kab Mob Siab Hepatitis C: Kuaj ib zaug hauv koj lub neej.  Kuaj Txoj Hlab Ntshav Hauv Plab: Nrog koj tus kws sarmad mob jason txog fernandez kuaj ntsuas no yog koj:  Tau haus luam yeeb ib zaug li; thiab  Yog txiv neej; thiab  Nruab hnub nyoog 65 thiab 75.  Mob Suzanne Cees (kis mob dhau ntawm fernandez sib deev)  Ua ntej muaj 22 xyoos: Nug koj pab pawg saib xyuas theresa puas tsim nyog kuaj brittany mob suzanne cees.  Ricky qab muaj 22 xyoos: Mus kuaj brittany mob suzanne cees yog koj muaj fernandez pheej hmoo. Koj muaj fernandez pheej hmoo brittany mob suzanne cees (suav nrog HIV) yog:  Koj sib deev nrog ntau mikal ib tug neeg.  Koj tsis siv cov hnab looj qau thaum sib deev.  Koj los yog koj tus khub deev kuaj pom tias muaj mob suzanne cees lawm.  Yog koj muaj fernandez pheej hmoo brittany mob suzanne cees HIV, nug txog cov tshuaj PrEP kom tiv thaiv ntawm HIV.  Mus kuaj brittany mob suzanne cees HIV yam ntau mikal ib zaug hauv koj lub neej, txawm yog koj muaj fernandez pheej hmoo brittany mob suzanne cees HIV los tsis muaj los xij.  Kuaj brittany mob khees xaws  Kuaj lub ncauj tsev me nyuam brittany mob khees xaws: Yog koj muaj ib lub ncauj tsev me nyuam, sagar yuav tau ib sij kuaj lub ncauj tsev me nyuam seb puas muaj mob khees xaws pib thaum muaj 19 xyoos. Neeg feem coob uas ib sij kuaj lub ncauj tsev me nyuam thiab tsis pom dab tsi txawv lawv tsum tau ricky qab muaj 63 xyoos. Nrog koj tus kws sarmad mob sib jason txog qhov no.  Kuaj lub mis brittany mob khees xaws (mammogram): Yog koj tau muaj mis mikal li, yuav tau ib sij kuaj lub mis pib thaum muaj 40 xyoo. Fernandez kuaj no yog kom saib seb puas muaj mob khees xaws hauv lub mis.  Kuaj Txoj Hnyuv Brittany Mob Khees Xaws: Yeej tseem ceeb pib kuaj txoj hnyuv brittany mob khees xaws pib thaum muaj 43 xyoos.  Txhua 8 xyoo yuav tau kuaj txoj hnyuv (los yog ntau zog yog koj muaj fernandez pheej hmoo) Los sis, nug koj tus kws sarmad mob txog fernandez kuaj thooj quav FIT txhua xyoo los yog Cologuard txhua 1 xyoos.  Kom thiaj kawm ntxiv txog tara sarah kuaj  no, mus saib: [email or web address].  Yog xav tau fernandez pab txog qhov no, mus saib: [email or web address].  Kuaj lub dominik kua phev (prostate) brittany mob khees xaws: Yog koj muaj ib lub dominik kua phev (prostate) thiab nruab hnub nyoog 53 mus brittany 67, nug koj tus kws sarmad mob theresa puas tsim nyog brittany koj kuaj lub dominik kua phev.  Kuaj ntsws brittany mob khees xaws: Yog koj haus luam yeeb dennison sim no los yog tau haus yav tas los uas muaj hnub nyoog nruab 48 xyoos mus brittany 78 xyoo, nug koj pab pawg saib xyuas theresa puas tsim nyog brittany koj yeej meem kuaj lub ntsws brittany mob khees xaws.    Brittany cov sarah phiaj fernandez siv ua ntaub ntawv qhia nkaus xwb. Yuav tsis pauv fernandez qhia los ntawm koj qhov chaw sarmad mob. Copyright (marcelo butterfield)   2023 [facility].   Txhua txoj scout raug tswj tseg lawm. Tshaj xyuas los ntawm [facility]. [employer] [id number] - REV 04/24.
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
## 2025-04-10 LAB
ANION GAP SERPL CALCULATED.3IONS-SCNC: 14 MMOL/L (ref 7–15)
BUN SERPL-MCNC: 8.4 MG/DL (ref 8–23)
CALCIUM SERPL-MCNC: 9.6 MG/DL (ref 8.8–10.4)
CHLORIDE SERPL-SCNC: 104 MMOL/L (ref 98–107)
CHOLEST SERPL-MCNC: 276 MG/DL
CREAT SERPL-MCNC: 0.53 MG/DL (ref 0.51–0.95)
EGFRCR SERPLBLD CKD-EPI 2021: >90 ML/MIN/1.73M2
FASTING STATUS PATIENT QL REPORTED: ABNORMAL
FASTING STATUS PATIENT QL REPORTED: ABNORMAL
GLUCOSE SERPL-MCNC: 108 MG/DL (ref 70–99)
HCO3 SERPL-SCNC: 24 MMOL/L (ref 22–29)
HDLC SERPL-MCNC: 56 MG/DL
LDLC SERPL CALC-MCNC: 184 MG/DL
NONHDLC SERPL-MCNC: 220 MG/DL
POTASSIUM SERPL-SCNC: 4.2 MMOL/L (ref 3.4–5.3)
SODIUM SERPL-SCNC: 142 MMOL/L (ref 135–145)
TRIGL SERPL-MCNC: 180 MG/DL

## 2025-05-27 ENCOUNTER — OFFICE VISIT (OUTPATIENT)
Dept: FAMILY MEDICINE | Facility: CLINIC | Age: 65
End: 2025-05-27
Payer: COMMERCIAL

## 2025-05-27 VITALS
HEIGHT: 58 IN | OXYGEN SATURATION: 96 % | DIASTOLIC BLOOD PRESSURE: 91 MMHG | BODY MASS INDEX: 27.71 KG/M2 | WEIGHT: 132 LBS | HEART RATE: 70 BPM | RESPIRATION RATE: 22 BRPM | TEMPERATURE: 98.1 F | SYSTOLIC BLOOD PRESSURE: 159 MMHG

## 2025-05-27 DIAGNOSIS — E11.9 TYPE 2 DIABETES MELLITUS WITHOUT COMPLICATION, WITHOUT LONG-TERM CURRENT USE OF INSULIN (H): Primary | ICD-10-CM

## 2025-05-27 DIAGNOSIS — I10 BENIGN ESSENTIAL HYPERTENSION: ICD-10-CM

## 2025-05-27 PROCEDURE — 3078F DIAST BP <80 MM HG: CPT

## 2025-05-27 PROCEDURE — 3077F SYST BP >= 140 MM HG: CPT

## 2025-05-27 PROCEDURE — 99214 OFFICE O/P EST MOD 30 MIN: CPT | Mod: GC

## 2025-05-27 NOTE — NURSING NOTE
For the BP follow up, pt will call us back to schedule because she doesn't know her schedule quite yet.

## 2025-05-27 NOTE — PROGRESS NOTES
"  Assessment & Plan     Chloe is here for refill of test strips and discussed chronic problems as below.    Type 2 diabetes mellitus without complication, without long-term current use of insulin (H)  Fasting -120.    - continue metformin  - blood glucose (NO BRAND SPECIFIED) test strip  Dispense: 100 strip; Refill: 3    Benign essential hypertension  BP elevated in clinic today.  Checks her BP at home occasionally with neighbors BP cuff and it is not as high as in clinic.  Recommended buying BP cuff to monitor her BP at home, she was agreeable and said her children could help her do this.  Close follow-up recommended for BP check.  - Continue amlodipine-benazepril 5-20  - follow-up in 2 weeks        Subjective   Chloe is a 64 year old, presenting for the following health issues:  Refill Request (Test strips. Pharmacy told her to come see her provider to be able to refill it. )    Going good since last visit    116-120 in morning          5/27/2025    11:09 AM   Additional Questions   Roomed by Gabriela   Accompanied by Self         5/27/2025    Information    services provided? Yes   Language Hmong   Type of interpretation provided Face-to-face    name Chi    Agency [facility]           Objective    BP (!) 159/91   Pulse 70   Temp 98.1  F (36.7  C) (Tympanic)   Resp 22   Ht 1.475 m (4' 10.07\")   Wt 59.9 kg (132 lb)   SpO2 96%   BMI 27.52 kg/m    Body mass index is 27.52 kg/m .  Physical Exam     GENERAL: healthy, alert and no distress  RESP: speaking in full sentences, normal work of breathing   CV: extremities appear well perfused  MS: no gross musculoskeletal defects noted  PSYCH: mentation appears normal, affect normal/bright             Signed Electronically by: Long Aviles MD    "

## 2025-06-04 ENCOUNTER — ANCILLARY PROCEDURE (OUTPATIENT)
Facility: CLINIC | Age: 65
End: 2025-06-04
Payer: COMMERCIAL

## 2025-06-04 DIAGNOSIS — Z12.31 VISIT FOR SCREENING MAMMOGRAM: ICD-10-CM

## 2025-06-04 PROCEDURE — 77063 BREAST TOMOSYNTHESIS BI: CPT | Mod: TC | Performed by: RADIOLOGY

## 2025-06-04 PROCEDURE — 77067 SCR MAMMO BI INCL CAD: CPT | Mod: TC | Performed by: RADIOLOGY

## 2025-06-24 ENCOUNTER — TRANSFERRED RECORDS (OUTPATIENT)
Dept: HEALTH INFORMATION MANAGEMENT | Facility: CLINIC | Age: 65
End: 2025-06-24
Payer: COMMERCIAL

## 2025-07-22 ENCOUNTER — OFFICE VISIT (OUTPATIENT)
Dept: FAMILY MEDICINE | Facility: CLINIC | Age: 65
End: 2025-07-22
Payer: COMMERCIAL

## 2025-07-22 VITALS
HEART RATE: 71 BPM | HEIGHT: 58 IN | SYSTOLIC BLOOD PRESSURE: 117 MMHG | BODY MASS INDEX: 27.51 KG/M2 | OXYGEN SATURATION: 97 % | TEMPERATURE: 97.8 F | WEIGHT: 131.04 LBS | DIASTOLIC BLOOD PRESSURE: 69 MMHG | RESPIRATION RATE: 16 BRPM

## 2025-07-22 DIAGNOSIS — E11.9 TYPE 2 DIABETES MELLITUS WITHOUT COMPLICATION, WITHOUT LONG-TERM CURRENT USE OF INSULIN (H): Primary | ICD-10-CM

## 2025-07-22 DIAGNOSIS — E78.00 PURE HYPERCHOLESTEROLEMIA: ICD-10-CM

## 2025-07-22 DIAGNOSIS — K59.00 CONSTIPATION, UNSPECIFIED CONSTIPATION TYPE: ICD-10-CM

## 2025-07-22 DIAGNOSIS — I10 BENIGN ESSENTIAL HYPERTENSION: ICD-10-CM

## 2025-07-22 PROCEDURE — 99214 OFFICE O/P EST MOD 30 MIN: CPT | Mod: GC

## 2025-07-22 PROCEDURE — T1013 SIGN LANG/ORAL INTERPRETER: HCPCS | Mod: GT | Performed by: INTERPRETER

## 2025-07-22 PROCEDURE — 3078F DIAST BP <80 MM HG: CPT

## 2025-07-22 PROCEDURE — 3074F SYST BP LT 130 MM HG: CPT

## 2025-07-22 RX ORDER — ATORVASTATIN CALCIUM 80 MG/1
80 TABLET, FILM COATED ORAL DAILY
Qty: 90 TABLET | Refills: 3 | Status: SHIPPED | OUTPATIENT
Start: 2025-07-22

## 2025-07-22 RX ORDER — POLYETHYLENE GLYCOL 3350 17 G/17G
17 POWDER, FOR SOLUTION ORAL DAILY
Qty: 510 G | Refills: 3 | Status: SHIPPED | OUTPATIENT
Start: 2025-07-22

## 2025-07-22 NOTE — PROGRESS NOTES
"  Assessment & Plan     Type 2 diabetes mellitus without complication, without long-term current use of insulin (H)  Chloe has been having issues with her glucose monitor.  It has started giving readings in the 20s and 30s when she checks in the morning, which she knows is not accurate, because she has no symptoms of low blood sugar.  Will send new meter today.  - blood glucose monitoring (NO BRAND SPECIFIED) meter device kit  Dispense: 1 kit; Refill: 0  - atorvastatin (LIPITOR) 80 MG tablet  Dispense: 90 tablet; Refill: 3  - Follow-up in 3 months for A1c    Benign essential hypertension  BP at goal in clinic today.  Continue medicine as prescribed    Pure hypercholesterolemia  With HLD and T2DM, will increase to high-dose statin today.  Patient was agreeable  - atorvastatin (LIPITOR) 80 MG tablet  Dispense: 90 tablet; Refill: 3    Constipation, unspecified constipation type  - polyethylene glycol (MIRALAX) 17 GM/Dose powder  Dispense: 510 g; Refill: 3      Subjective   Chloe is a 65 year old, presenting for the following health issues:  Follow Up (Pt is here for 2 week follow up and need help with glucose monitor. ) and Medication Refill (Miralax medication refill )    She has 2 questions  Glucose reader doesn't give a number  Needs refill of mirilax        7/22/2025    10:08 AM   Additional Questions   Roomed by Hser   Accompanied by Self         7/22/2025    Information    services provided? Yes   Language Hmong   Type of interpretation provided Video    name Sheehan    Agency [facility]  Services           Objective    /69 (BP Location: Right arm, Patient Position: Sitting)   Pulse 71   Temp 97.8  F (36.6  C) (Oral)   Resp 16   Ht 1.47 m (4' 9.87\")   Wt 59.4 kg (131 lb 0.6 oz)   SpO2 97%   BMI 27.51 kg/m    Body mass index is 27.51 kg/m .  Physical Exam     GENERAL: healthy, alert and no distress  RESP: lungs clear, speaking in full sentences, " normal work of breathing   CV: RRR, extremities well perfused  MS: no gross musculoskeletal defects noted, no edema  PSYCH: mentation appears normal, affect normal/bright           Signed Electronically by: Long Aviles MD